# Patient Record
Sex: FEMALE | Race: WHITE | NOT HISPANIC OR LATINO | Employment: FULL TIME | URBAN - METROPOLITAN AREA
[De-identification: names, ages, dates, MRNs, and addresses within clinical notes are randomized per-mention and may not be internally consistent; named-entity substitution may affect disease eponyms.]

---

## 2023-07-28 LAB — URINE CULTURE: NORMAL

## 2023-08-23 ENCOUNTER — OFFICE VISIT (OUTPATIENT)
Dept: PRIMARY CARE | Facility: CLINIC | Age: 48
End: 2023-08-23
Payer: COMMERCIAL

## 2023-08-23 VITALS
WEIGHT: 292 LBS | HEART RATE: 75 BPM | OXYGEN SATURATION: 96 % | SYSTOLIC BLOOD PRESSURE: 133 MMHG | DIASTOLIC BLOOD PRESSURE: 83 MMHG | BODY MASS INDEX: 44.25 KG/M2 | HEIGHT: 68 IN

## 2023-08-23 DIAGNOSIS — R00.2 PALPITATIONS: Primary | ICD-10-CM

## 2023-08-23 PROCEDURE — 99204 OFFICE O/P NEW MOD 45 MIN: CPT | Performed by: NURSE PRACTITIONER

## 2023-08-23 RX ORDER — DILTIAZEM HYDROCHLORIDE 120 MG/1
120 CAPSULE, COATED, EXTENDED RELEASE ORAL DAILY
Qty: 30 CAPSULE | Refills: 5 | Status: SHIPPED | OUTPATIENT
Start: 2023-08-23 | End: 2023-11-22 | Stop reason: ALTCHOICE

## 2023-08-23 RX ORDER — MELOXICAM 15 MG/1
1 TABLET ORAL DAILY PRN
COMMUNITY
Start: 2018-05-20 | End: 2023-08-23 | Stop reason: ALTCHOICE

## 2023-08-23 RX ORDER — DIAZEPAM 2 MG/1
TABLET ORAL
COMMUNITY
Start: 2022-10-06 | End: 2023-08-23 | Stop reason: ALTCHOICE

## 2023-08-23 RX ORDER — OMEPRAZOLE 40 MG/1
CAPSULE, DELAYED RELEASE ORAL
COMMUNITY
Start: 2018-09-20 | End: 2023-08-23 | Stop reason: ALTCHOICE

## 2023-08-23 RX ORDER — METOPROLOL SUCCINATE 50 MG/1
50 TABLET, EXTENDED RELEASE ORAL EVERY 12 HOURS
COMMUNITY
End: 2023-08-23 | Stop reason: ALTCHOICE

## 2023-08-23 RX ORDER — ASPIRIN 325 MG
TABLET ORAL 2 TIMES DAILY
COMMUNITY
Start: 2018-09-19 | End: 2023-08-23 | Stop reason: ALTCHOICE

## 2023-08-23 RX ORDER — SODIUM FLUORIDE 6 MG/ML
PASTE, DENTIFRICE DENTAL
COMMUNITY
Start: 2023-01-01 | End: 2023-08-23 | Stop reason: ALTCHOICE

## 2023-08-23 ASSESSMENT — PATIENT HEALTH QUESTIONNAIRE - PHQ9
2. FEELING DOWN, DEPRESSED OR HOPELESS: NOT AT ALL
1. LITTLE INTEREST OR PLEASURE IN DOING THINGS: NOT AT ALL
SUM OF ALL RESPONSES TO PHQ9 QUESTIONS 1 AND 2: 0

## 2023-08-23 ASSESSMENT — ENCOUNTER SYMPTOMS
FEVER: 0
WEAKNESS: 0
SORE THROAT: 0
DIARRHEA: 0
ABDOMINAL PAIN: 0
PALPITATIONS: 1
MUSCULOSKELETAL NEGATIVE: 1
FATIGUE: 0
HEADACHES: 0
CONSTIPATION: 0
PSYCHIATRIC NEGATIVE: 1
LIGHT-HEADEDNESS: 1
VOMITING: 0
COUGH: 0
NAUSEA: 0
NUMBNESS: 0
SHORTNESS OF BREATH: 0
DIZZINESS: 0
CHILLS: 0

## 2023-08-23 NOTE — PROGRESS NOTES
"Subjective   Patient ID: Sarah Gay is a 47 y.o. female who presents  to establish relationship with primary care provider.        HPI   . 2 grown daughters. 1st grandson on the way.  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  Daily smoker. No alcohol use or illicit drug use.  Physically active at work no formal exercise routine.  Feels like she is fighting to stay conscious. Has been going for   At least 20 years. Happening about 3 times weekly.  Does not matter if she is exerting herself or resting.  Position does not seem to have an effect either.  Has known PFO.  Has longtime history of heart palpitations.  Has known PACs and PVCs.  Been on metoprolol for a long time.  Not able to tell if it is really working feels like the palpitations have become stronger not necessarily more frequent.  Admits to large amount of caffeine intake during the day.  Current smoker.  Had knee replacement at 43.  Suffers with arthritis pain in most of her joints.  Denies chest pain, SOB, dizziness,  or GI issues.    Review of Systems   Constitutional:  Negative for chills, fatigue and fever.   HENT:  Negative for congestion, ear pain and sore throat.    Eyes:  Negative for visual disturbance.   Respiratory:  Negative for cough and shortness of breath.         Feels like she can't can't her breath.   Cardiovascular:  Positive for palpitations. Negative for chest pain and leg swelling.   Gastrointestinal:  Negative for abdominal pain, constipation, diarrhea, nausea and vomiting.   Genitourinary: Negative.    Musculoskeletal: Negative.    Skin:  Negative for rash.   Neurological:  Positive for light-headedness. Negative for dizziness, weakness, numbness and headaches.   Psychiatric/Behavioral: Negative.         Objective   /83   Pulse 75   Ht 1.727 m (5' 8\")   Wt 132 kg (292 lb)   SpO2 96%   BMI 44.40 kg/m²     Physical Exam  Constitutional:       General: She is not in acute distress.     " Appearance: Normal appearance.   HENT:      Head: Normocephalic and atraumatic.      Right Ear: Tympanic membrane, ear canal and external ear normal.      Left Ear: Tympanic membrane, ear canal and external ear normal.      Nose: Nose normal.      Mouth/Throat:      Mouth: Mucous membranes are moist.      Pharynx: Oropharynx is clear.   Eyes:      Extraocular Movements: Extraocular movements intact.      Conjunctiva/sclera: Conjunctivae normal.      Pupils: Pupils are equal, round, and reactive to light.   Cardiovascular:      Rate and Rhythm: Normal rate and regular rhythm.      Pulses: Normal pulses.      Heart sounds: Normal heart sounds. No murmur heard.  Pulmonary:      Effort: Pulmonary effort is normal.      Breath sounds: Normal breath sounds. No wheezing, rhonchi or rales.   Abdominal:      General: Bowel sounds are normal.      Palpations: Abdomen is soft.      Tenderness: There is no abdominal tenderness.   Musculoskeletal:         General: Normal range of motion.      Cervical back: Normal range of motion and neck supple.   Lymphadenopathy:      Comments: No lymphadenopathy noted   Skin:     General: Skin is warm and dry.      Findings: No rash.   Neurological:      General: No focal deficit present.      Mental Status: She is alert and oriented to person, place, and time.      Cranial Nerves: No cranial nerve deficit.      Coordination: Coordination normal.      Gait: Gait normal.   Psychiatric:         Mood and Affect: Mood normal.         Behavior: Behavior normal.       Assessment/Plan   Problem List Items Addressed This Visit       Palpitations - Primary     Stop metoprolol  Start Cardizem 120 mg daily.  Have cardiac calcium scoring done.  Work on decreasing caffeine intake.  Monitor and record blood pressures at home bring log to next visit  We will consider echocardiogram for known PFO and possible correlation with symptoms

## 2023-08-23 NOTE — ASSESSMENT & PLAN NOTE
Stop metoprolol  Start Cardizem 120 mg daily.  Have cardiac calcium scoring done.  Work on decreasing caffeine intake.  Monitor and record blood pressures at home bring log to next visit  We will consider echocardiogram or R[facility]ETTA for known PFO and possible correlation with symptoms

## 2023-09-14 DIAGNOSIS — I10 HYPERTENSION, UNSPECIFIED TYPE: Primary | ICD-10-CM

## 2023-09-14 RX ORDER — LISINOPRIL 10 MG/1
10 TABLET ORAL DAILY
Qty: 30 TABLET | Refills: 5 | Status: SHIPPED | OUTPATIENT
Start: 2023-09-14 | End: 2023-11-22 | Stop reason: ALTCHOICE

## 2023-09-14 NOTE — PROGRESS NOTES
Called and left message for Cherry FREEMAN to call patient and let her know she can start lisinopril 10 mg daily.  Continue to monitor blood pressure until she sees me at follow-up appointment.  Reassurance given.  Will evaluate at upcoming visit.

## 2023-10-04 ENCOUNTER — OFFICE VISIT (OUTPATIENT)
Dept: PRIMARY CARE | Facility: CLINIC | Age: 48
End: 2023-10-04
Payer: COMMERCIAL

## 2023-10-04 VITALS
DIASTOLIC BLOOD PRESSURE: 85 MMHG | OXYGEN SATURATION: 96 % | WEIGHT: 287 LBS | SYSTOLIC BLOOD PRESSURE: 124 MMHG | HEART RATE: 78 BPM | HEIGHT: 68 IN | BODY MASS INDEX: 43.5 KG/M2

## 2023-10-04 DIAGNOSIS — Q21.12 PATENT FORAMEN OVALE (HHS-HCC): ICD-10-CM

## 2023-10-04 DIAGNOSIS — I25.10 CORONARY ARTERY DISEASE INVOLVING NATIVE CORONARY ARTERY OF NATIVE HEART WITHOUT ANGINA PECTORIS: Primary | ICD-10-CM

## 2023-10-04 DIAGNOSIS — G43.809 OTHER MIGRAINE WITHOUT STATUS MIGRAINOSUS, NOT INTRACTABLE: ICD-10-CM

## 2023-10-04 DIAGNOSIS — R55 NEAR SYNCOPE: ICD-10-CM

## 2023-10-04 DIAGNOSIS — E78.5 HYPERLIPIDEMIA, UNSPECIFIED HYPERLIPIDEMIA TYPE: ICD-10-CM

## 2023-10-04 DIAGNOSIS — I10 HYPERTENSION, UNSPECIFIED TYPE: ICD-10-CM

## 2023-10-04 DIAGNOSIS — F17.200 TOBACCO DEPENDENCE: ICD-10-CM

## 2023-10-04 DIAGNOSIS — R00.2 PALPITATIONS: ICD-10-CM

## 2023-10-04 PROBLEM — G43.909 MIGRAINE WITHOUT STATUS MIGRAINOSUS, NOT INTRACTABLE: Status: ACTIVE | Noted: 2023-10-04

## 2023-10-04 PROCEDURE — 99214 OFFICE O/P EST MOD 30 MIN: CPT | Performed by: NURSE PRACTITIONER

## 2023-10-04 PROCEDURE — 3074F SYST BP LT 130 MM HG: CPT | Performed by: NURSE PRACTITIONER

## 2023-10-04 PROCEDURE — 3079F DIAST BP 80-89 MM HG: CPT | Performed by: NURSE PRACTITIONER

## 2023-10-04 RX ORDER — ROSUVASTATIN CALCIUM 5 MG/1
5 TABLET, COATED ORAL DAILY
Qty: 90 TABLET | Refills: 3 | Status: SHIPPED | OUTPATIENT
Start: 2023-10-04 | End: 2023-11-22 | Stop reason: ALTCHOICE

## 2023-10-04 RX ORDER — PROPRANOLOL HYDROCHLORIDE 60 MG/1
60 CAPSULE, EXTENDED RELEASE ORAL DAILY
Qty: 30 CAPSULE | Refills: 11 | Status: SHIPPED | OUTPATIENT
Start: 2023-10-04 | End: 2023-11-22 | Stop reason: ALTCHOICE

## 2023-10-04 RX ORDER — ASPIRIN 81 MG/1
81 TABLET ORAL DAILY
COMMUNITY

## 2023-10-04 RX ORDER — ROSUVASTATIN CALCIUM 5 MG/1
5 TABLET, COATED ORAL DAILY
COMMUNITY
Start: 2023-09-28 | End: 2023-10-04 | Stop reason: SDUPTHER

## 2023-10-04 ASSESSMENT — ENCOUNTER SYMPTOMS
PSYCHIATRIC NEGATIVE: 1
WEAKNESS: 0
FATIGUE: 0
COUGH: 0
DIARRHEA: 0
HEADACHES: 0
CONSTIPATION: 0
FEVER: 0
PALPITATIONS: 1
MUSCULOSKELETAL NEGATIVE: 1
ABDOMINAL PAIN: 0
SHORTNESS OF BREATH: 0
NAUSEA: 0
SORE THROAT: 0
DIZZINESS: 1
CHILLS: 0
NUMBNESS: 0
VOMITING: 0

## 2023-10-04 NOTE — ASSESSMENT & PLAN NOTE
Scheduled for nuclear stress test.  Plan of care pending results.  Continue aspirin and statin as scheduled

## 2023-10-04 NOTE — ASSESSMENT & PLAN NOTE
Continue lisinopril as prescribed  Continue to monitor and record blood pressures at home goal BP still 130/80.

## 2023-10-04 NOTE — PROGRESS NOTES
"Subjective   Patient ID: Sarah Gay is a 47 y.o. female who presents for follow up.      HPI  Patient here today with her daughter.  Has seen cardiology, cardiac calcium score greater than 500.  Scheduled for stress test on Thursday  Patient has a lot of questions.  Continues to have near syncopal events.  Dizziness has not improved at all.  However palpitations seem to have lessened on the Cardizem.  Discussed ordering cardiac MRI and seeing electrophysiology for PFO and possible correlation to near syncope.  She continues to have a lot of joint pain and fatigue.  Denies chest pain, SOB,  or GI issues.  Taking medications as prescribed      Review of Systems   Constitutional:  Negative for chills, fatigue and fever.   HENT:  Negative for congestion, ear pain and sore throat.    Eyes:  Negative for visual disturbance.   Respiratory:  Negative for cough and shortness of breath.    Cardiovascular:  Positive for palpitations. Negative for chest pain and leg swelling.   Gastrointestinal:  Negative for abdominal pain, constipation, diarrhea, nausea and vomiting.   Genitourinary: Negative.    Musculoskeletal: Negative.    Skin:  Negative for rash.   Neurological:  Positive for dizziness and syncope (near syncope). Negative for weakness, numbness and headaches.   Psychiatric/Behavioral: Negative.         Objective   /85   Pulse 78   Ht 1.727 m (5' 8\")   Wt 130 kg (287 lb)   SpO2 96%   BMI 43.64 kg/m²     Physical Exam  Constitutional:       General: She is not in acute distress.     Appearance: Normal appearance.   HENT:      Head: Normocephalic and atraumatic.      Right Ear: Tympanic membrane, ear canal and external ear normal.      Left Ear: Tympanic membrane, ear canal and external ear normal.      Nose: Nose normal.      Mouth/Throat:      Mouth: Mucous membranes are moist.      Pharynx: Oropharynx is clear.   Eyes:      Extraocular Movements: Extraocular movements intact.      Conjunctiva/sclera: " Conjunctivae normal.      Pupils: Pupils are equal, round, and reactive to light.   Cardiovascular:      Rate and Rhythm: Normal rate and regular rhythm.      Pulses: Normal pulses.      Heart sounds: Normal heart sounds. No murmur heard.  Pulmonary:      Effort: Pulmonary effort is normal.      Breath sounds: Normal breath sounds. No wheezing, rhonchi or rales.   Abdominal:      General: Bowel sounds are normal.      Palpations: Abdomen is soft.      Tenderness: There is no abdominal tenderness.   Musculoskeletal:         General: Normal range of motion.      Cervical back: Normal range of motion and neck supple.   Lymphadenopathy:      Comments: No lymphadenopathy noted   Skin:     General: Skin is warm and dry.      Findings: No rash.   Neurological:      General: No focal deficit present.      Mental Status: She is alert and oriented to person, place, and time.      Cranial Nerves: No cranial nerve deficit.      Coordination: Coordination normal.      Gait: Gait normal.   Psychiatric:         Mood and Affect: Mood normal.         Behavior: Behavior normal.         Assessment/Plan   Problem List Items Addressed This Visit             ICD-10-CM    Palpitations R00.2     Continue Cardizem as prescribed  Follow-up with cardiology as scheduled         Migraine without status migrainosus, not intractable G43.909     Start propranolol 60 mg daily for migraine prophylaxis and palpitations         Relevant Medications    propranolol LA (Inderal LA) 60 mg 24 hr capsule    Other Relevant Orders    Referral to Cardiac Electrophysiology    Hyperlipidemia E78.5     Continue statin as prescribed  Have fasting lipids drawn with all other labs         Relevant Medications    propranolol LA (Inderal LA) 60 mg 24 hr capsule    rosuvastatin (Crestor) 5 mg tablet    Other Relevant Orders    Referral to Cardiac Electrophysiology    Comprehensive Metabolic Panel    TSH with reflex to Free T4 if abnormal    Lipid Panel    CBC and Auto  Differential    Hypertension I10     Continue lisinopril as prescribed  Continue to monitor and record blood pressures at home goal BP still 130/80.         Near syncope R55     Schedule cardiac MRI as ordered         Relevant Orders    MR cardiac morphology and function w and wo IV contrast    Coronary artery disease involving native coronary artery of native heart without angina pectoris - Primary I25.10     Scheduled for nuclear stress test.  Plan of care pending results.  Continue aspirin and statin as scheduled         Relevant Medications    propranolol LA (Inderal LA) 60 mg 24 hr capsule    Other Relevant Orders    MR cardiac morphology and function w and wo IV contrast    Patent foramen ovale Q21.12     Schedule cardiac MRI for PFO, migraines, persistent near syncopal episodes         Relevant Medications    propranolol LA (Inderal LA) 60 mg 24 hr capsule    Other Relevant Orders    MR cardiac morphology and function w and wo IV contrast    Tobacco dependence F17.200   Mammogram: up to date. Due 8/2024  Follow up after cardiac testing.

## 2023-10-04 NOTE — PATIENT INSTRUCTIONS
Continue Cardizem, rosuvastatin, aspirin.  Start propranolol 60 mg daily for palpitations and migraine preventative  Have a nuclear stress test as scheduled  Will order cardiac MRI for PFO and syncope  Will refer to cardiac electrophysiology for palpitations and syncope possible secondary to PFO  Discussed cardiac calcium score.  Follow-up with cardiology as scheduled after stress test follow-up with me after testing  Have fasting blood work drawn.  Fast for 12 hours can have black coffee water or tea

## 2023-10-06 ENCOUNTER — LAB (OUTPATIENT)
Dept: LAB | Facility: LAB | Age: 48
End: 2023-10-06
Payer: COMMERCIAL

## 2023-10-06 DIAGNOSIS — E78.5 HYPERLIPIDEMIA, UNSPECIFIED HYPERLIPIDEMIA TYPE: ICD-10-CM

## 2023-10-06 LAB
ALBUMIN SERPL BCP-MCNC: 4.4 G/DL (ref 3.4–5)
ALP SERPL-CCNC: 43 U/L (ref 33–110)
ALT SERPL W P-5'-P-CCNC: 9 U/L (ref 7–45)
ANION GAP SERPL CALC-SCNC: 14 MMOL/L (ref 10–20)
AST SERPL W P-5'-P-CCNC: 16 U/L (ref 9–39)
BASOPHILS # BLD AUTO: 0.04 X10*3/UL (ref 0–0.1)
BASOPHILS NFR BLD AUTO: 0.7 %
BILIRUB SERPL-MCNC: 0.8 MG/DL (ref 0–1.2)
BUN SERPL-MCNC: 9 MG/DL (ref 6–23)
CALCIUM SERPL-MCNC: 9.3 MG/DL (ref 8.6–10.3)
CHLORIDE SERPL-SCNC: 103 MMOL/L (ref 98–107)
CHOLEST SERPL-MCNC: 151 MG/DL (ref 0–199)
CHOLESTEROL/HDL RATIO: 3.8
CO2 SERPL-SCNC: 25 MMOL/L (ref 21–32)
CREAT SERPL-MCNC: 0.63 MG/DL (ref 0.5–1.05)
EOSINOPHIL # BLD AUTO: 0.04 X10*3/UL (ref 0–0.7)
EOSINOPHIL NFR BLD AUTO: 0.7 %
ERYTHROCYTE [DISTWIDTH] IN BLOOD BY AUTOMATED COUNT: 13.4 % (ref 11.5–14.5)
GFR SERPL CREATININE-BSD FRML MDRD: >90 ML/MIN/1.73M*2
GLUCOSE SERPL-MCNC: 84 MG/DL (ref 74–99)
HCT VFR BLD AUTO: 47.4 % (ref 36–46)
HDLC SERPL-MCNC: 40 MG/DL
HGB BLD-MCNC: 15.8 G/DL (ref 12–16)
IMM GRANULOCYTES # BLD AUTO: 0.01 X10*3/UL (ref 0–0.7)
IMM GRANULOCYTES NFR BLD AUTO: 0.2 % (ref 0–0.9)
LDLC SERPL CALC-MCNC: 100 MG/DL (ref 140–190)
LYMPHOCYTES # BLD AUTO: 2.04 X10*3/UL (ref 1.2–4.8)
LYMPHOCYTES NFR BLD AUTO: 35.4 %
MCH RBC QN AUTO: 30.6 PG (ref 26–34)
MCHC RBC AUTO-ENTMCNC: 33.3 G/DL (ref 32–36)
MCV RBC AUTO: 92 FL (ref 80–100)
MONOCYTES # BLD AUTO: 0.53 X10*3/UL (ref 0.1–1)
MONOCYTES NFR BLD AUTO: 9.2 %
NEUTROPHILS # BLD AUTO: 3.11 X10*3/UL (ref 1.2–7.7)
NEUTROPHILS NFR BLD AUTO: 53.8 %
NON HDL CHOLESTEROL: 111 MG/DL (ref 0–149)
NRBC BLD-RTO: 0 /100 WBCS (ref 0–0)
PLATELET # BLD AUTO: 276 X10*3/UL (ref 150–450)
PMV BLD AUTO: 10.4 FL (ref 7.5–11.5)
POTASSIUM SERPL-SCNC: 4.6 MMOL/L (ref 3.5–5.3)
PROT SERPL-MCNC: 6.9 G/DL (ref 6.4–8.2)
RBC # BLD AUTO: 5.17 X10*6/UL (ref 4–5.2)
SODIUM SERPL-SCNC: 137 MMOL/L (ref 136–145)
TRIGL SERPL-MCNC: 57 MG/DL (ref 0–149)
TSH SERPL-ACNC: 1.43 MIU/L (ref 0.44–3.98)
VLDL: 11 MG/DL (ref 0–40)
WBC # BLD AUTO: 5.8 X10*3/UL (ref 4.4–11.3)

## 2023-10-06 PROCEDURE — 36415 COLL VENOUS BLD VENIPUNCTURE: CPT

## 2023-10-06 PROCEDURE — 80050 GENERAL HEALTH PANEL: CPT

## 2023-10-06 PROCEDURE — 80061 LIPID PANEL: CPT

## 2023-10-10 ENCOUNTER — HOSPITAL ENCOUNTER (OUTPATIENT)
Dept: RADIOLOGY | Facility: HOSPITAL | Age: 48
Discharge: HOME | End: 2023-10-10
Payer: COMMERCIAL

## 2023-10-10 ENCOUNTER — HOSPITAL ENCOUNTER (OUTPATIENT)
Dept: CARDIOLOGY | Facility: HOSPITAL | Age: 48
Discharge: HOME | End: 2023-10-10
Payer: COMMERCIAL

## 2023-10-10 DIAGNOSIS — R93.1 ELEVATED CORONARY ARTERY CALCIUM SCORE: ICD-10-CM

## 2023-10-10 PROCEDURE — A9502 TC99M TETROFOSMIN: HCPCS | Performed by: STUDENT IN AN ORGANIZED HEALTH CARE EDUCATION/TRAINING PROGRAM

## 2023-10-10 PROCEDURE — 3430000001 HC RX 343 DIAGNOSTIC RADIOPHARMACEUTICALS: Performed by: STUDENT IN AN ORGANIZED HEALTH CARE EDUCATION/TRAINING PROGRAM

## 2023-10-10 PROCEDURE — 78452 HT MUSCLE IMAGE SPECT MULT: CPT

## 2023-10-10 PROCEDURE — 93016 CV STRESS TEST SUPVJ ONLY: CPT | Performed by: STUDENT IN AN ORGANIZED HEALTH CARE EDUCATION/TRAINING PROGRAM

## 2023-10-10 PROCEDURE — 93017 CV STRESS TEST TRACING ONLY: CPT

## 2023-10-10 PROCEDURE — 78452 HT MUSCLE IMAGE SPECT MULT: CPT | Performed by: STUDENT IN AN ORGANIZED HEALTH CARE EDUCATION/TRAINING PROGRAM

## 2023-10-10 PROCEDURE — 93018 CV STRESS TEST I&R ONLY: CPT | Performed by: STUDENT IN AN ORGANIZED HEALTH CARE EDUCATION/TRAINING PROGRAM

## 2023-10-10 RX ADMIN — TETROFOSMIN 33.6 MILLICURIE: 0.23 INJECTION, POWDER, LYOPHILIZED, FOR SOLUTION INTRAVENOUS at 11:00

## 2023-10-10 RX ADMIN — TETROFOSMIN 11.2 MILLICURIE: 0.23 INJECTION, POWDER, LYOPHILIZED, FOR SOLUTION INTRAVENOUS at 09:40

## 2023-10-11 NOTE — RESULT ENCOUNTER NOTE
Spoke with pt and reviewed stress test. She voiced some concern that Jana ordered a cardiac MRI on 10/4 when she saw her for concerns of her PFO worsening and thinks that might be why she is having palpiations.. She is wondering your opinion if you can look at Jana's note

## 2023-10-25 ENCOUNTER — OFFICE VISIT (OUTPATIENT)
Dept: PRIMARY CARE | Facility: CLINIC | Age: 48
End: 2023-10-25
Payer: COMMERCIAL

## 2023-10-25 VITALS
OXYGEN SATURATION: 96 % | HEART RATE: 83 BPM | SYSTOLIC BLOOD PRESSURE: 121 MMHG | BODY MASS INDEX: 42.25 KG/M2 | HEIGHT: 68 IN | WEIGHT: 278.8 LBS | DIASTOLIC BLOOD PRESSURE: 83 MMHG

## 2023-10-25 DIAGNOSIS — E78.5 HYPERLIPIDEMIA, UNSPECIFIED HYPERLIPIDEMIA TYPE: ICD-10-CM

## 2023-10-25 DIAGNOSIS — R00.2 PALPITATIONS: ICD-10-CM

## 2023-10-25 DIAGNOSIS — Q21.12 PATENT FORAMEN OVALE (HHS-HCC): ICD-10-CM

## 2023-10-25 DIAGNOSIS — R42 DIZZINESS: Primary | ICD-10-CM

## 2023-10-25 DIAGNOSIS — I10 HYPERTENSION, UNSPECIFIED TYPE: ICD-10-CM

## 2023-10-25 DIAGNOSIS — F17.200 TOBACCO DEPENDENCE: ICD-10-CM

## 2023-10-25 DIAGNOSIS — I25.10 CORONARY ARTERY DISEASE INVOLVING NATIVE CORONARY ARTERY OF NATIVE HEART WITHOUT ANGINA PECTORIS: ICD-10-CM

## 2023-10-25 PROCEDURE — 99214 OFFICE O/P EST MOD 30 MIN: CPT | Performed by: NURSE PRACTITIONER

## 2023-10-25 PROCEDURE — 3074F SYST BP LT 130 MM HG: CPT | Performed by: NURSE PRACTITIONER

## 2023-10-25 PROCEDURE — 3079F DIAST BP 80-89 MM HG: CPT | Performed by: NURSE PRACTITIONER

## 2023-10-25 ASSESSMENT — ENCOUNTER SYMPTOMS
ARTHRALGIAS: 1
DIZZINESS: 1
NAUSEA: 0
PSYCHIATRIC NEGATIVE: 1
COUGH: 0
CONSTIPATION: 0
ABDOMINAL PAIN: 0
SORE THROAT: 0
FEVER: 0
HEADACHES: 0
MYALGIAS: 1
DIARRHEA: 0
NUMBNESS: 0
SHORTNESS OF BREATH: 0
VOMITING: 0
FATIGUE: 0
PALPITATIONS: 0
CHILLS: 0
WEAKNESS: 0

## 2023-10-25 ASSESSMENT — PATIENT HEALTH QUESTIONNAIRE - PHQ9
SUM OF ALL RESPONSES TO PHQ9 QUESTIONS 1 AND 2: 0
2. FEELING DOWN, DEPRESSED OR HOPELESS: NOT AT ALL
1. LITTLE INTEREST OR PLEASURE IN DOING THINGS: NOT AT ALL

## 2023-10-25 NOTE — PROGRESS NOTES
"Subjective   Patient ID: Sarah Gay is a 47 y.o. female who presents for follow up.      HPI   Dizzy 10 hours daily.  Like she is on a slow, rocking boat. No nausea. No visual disturbance.  Having ankle and leg swelling visit.  Has lost 9 pounds with strict diet over the past 3 weeks  Feeling occasional but tolerable palpitations on the Cardizem.  Has decreased caffeine intake.  Has decreased smoking significantly down to 8 to 10 cigarettes daily.  Would like to get answers as to why her joints are in pain all the time.  Does not want medications but wants to know if there is something wrong  Reviewed all inflammatory markers from previous labs.  Godwin current medications and possible side effects.  States she has had a dry cough for about 3 weeks.  Likely lisinopril.  Has had a stressful past few weeks.  Surgery done on her daughter 32 weeks pregnant.  She has not started propranolol for anxiety.  Does not like taking medications  Cardiac MRI scheduled for next Friday.    Review of Systems   Constitutional:  Negative for chills, fatigue and fever.   HENT:  Negative for congestion, ear pain and sore throat.    Eyes:  Negative for visual disturbance.   Respiratory:  Negative for cough and shortness of breath.    Cardiovascular:  Positive for leg swelling. Negative for chest pain and palpitations.   Gastrointestinal:  Negative for abdominal pain, constipation, diarrhea, nausea and vomiting.   Genitourinary: Negative.    Musculoskeletal:  Positive for arthralgias and myalgias.   Skin:  Negative for rash.   Neurological:  Positive for dizziness. Negative for weakness, numbness and headaches.   Psychiatric/Behavioral: Negative.         Objective   /83   Pulse 83   Ht 1.727 m (5' 8\")   Wt 126 kg (278 lb 12.8 oz)   LMP 10/07/2023   SpO2 96%   BMI 42.39 kg/m²     Physical Exam  Constitutional:       General: She is not in acute distress.     Appearance: Normal appearance.   HENT:      Head: Normocephalic and " atraumatic.      Right Ear: Tympanic membrane, ear canal and external ear normal.      Left Ear: Tympanic membrane, ear canal and external ear normal.      Nose: Nose normal.      Mouth/Throat:      Mouth: Mucous membranes are moist.      Pharynx: Oropharynx is clear.   Eyes:      Extraocular Movements: Extraocular movements intact.      Conjunctiva/sclera: Conjunctivae normal.      Pupils: Pupils are equal, round, and reactive to light.   Cardiovascular:      Rate and Rhythm: Normal rate and regular rhythm.      Pulses: Normal pulses.      Heart sounds: Normal heart sounds. No murmur heard.  Pulmonary:      Effort: Pulmonary effort is normal.      Breath sounds: Normal breath sounds. No wheezing, rhonchi or rales.   Abdominal:      General: Bowel sounds are normal.      Palpations: Abdomen is soft.      Tenderness: There is no abdominal tenderness.   Musculoskeletal:         General: Normal range of motion.      Cervical back: Normal range of motion and neck supple.   Lymphadenopathy:      Comments: No lymphadenopathy noted   Skin:     General: Skin is warm and dry.      Findings: No rash.   Neurological:      General: No focal deficit present.      Mental Status: She is alert and oriented to person, place, and time.      Cranial Nerves: No cranial nerve deficit.      Coordination: Coordination normal.      Gait: Gait normal.   Psychiatric:         Mood and Affect: Mood normal.         Behavior: Behavior normal.       Assessment/Plan   Problem List Items Addressed This Visit             ICD-10-CM    Palpitations R00.2    Hyperlipidemia E78.5    Hypertension I10    Coronary artery disease involving native coronary artery of native heart without angina pectoris I25.10    Patent foramen ovale Q21.12    Tobacco dependence F17.200    Dizziness - Primary R42   Decided we will do a trial for 30 days off of all medications.  Stopping diltiazem, lisinopril, rosuvastatin, and propranolol.  Doing this to see how patient feels  off of all medications, may be able to sort out symptoms.  Past cardiac MRI.  Follow-up in 30 days to re-address symptoms and medications.  If palpitations become intolerable may start metoprolol succinate 50 mg daily you have at home prior to next visit.

## 2023-11-22 ENCOUNTER — OFFICE VISIT (OUTPATIENT)
Dept: PRIMARY CARE | Facility: CLINIC | Age: 48
End: 2023-11-22
Payer: COMMERCIAL

## 2023-11-22 VITALS
WEIGHT: 281 LBS | OXYGEN SATURATION: 95 % | HEIGHT: 68 IN | DIASTOLIC BLOOD PRESSURE: 76 MMHG | SYSTOLIC BLOOD PRESSURE: 122 MMHG | HEART RATE: 89 BPM | BODY MASS INDEX: 42.59 KG/M2

## 2023-11-22 DIAGNOSIS — H65.493 CHRONIC OTITIS MEDIA OF BOTH EARS WITH EFFUSION: ICD-10-CM

## 2023-11-22 DIAGNOSIS — R42 DIZZINESS: ICD-10-CM

## 2023-11-22 DIAGNOSIS — I10 HYPERTENSION, UNSPECIFIED TYPE: ICD-10-CM

## 2023-11-22 DIAGNOSIS — R00.2 PALPITATIONS: ICD-10-CM

## 2023-11-22 DIAGNOSIS — R55 NEAR SYNCOPE: ICD-10-CM

## 2023-11-22 DIAGNOSIS — F17.200 TOBACCO DEPENDENCE: ICD-10-CM

## 2023-11-22 DIAGNOSIS — J32.9 CHRONIC SINUSITIS, UNSPECIFIED LOCATION: Primary | ICD-10-CM

## 2023-11-22 PROCEDURE — 3074F SYST BP LT 130 MM HG: CPT | Performed by: NURSE PRACTITIONER

## 2023-11-22 PROCEDURE — 3078F DIAST BP <80 MM HG: CPT | Performed by: NURSE PRACTITIONER

## 2023-11-22 PROCEDURE — 99214 OFFICE O/P EST MOD 30 MIN: CPT | Performed by: NURSE PRACTITIONER

## 2023-11-22 RX ORDER — AMOXICILLIN AND CLAVULANATE POTASSIUM 875; 125 MG/1; MG/1
875 TABLET, FILM COATED ORAL 2 TIMES DAILY
Qty: 20 TABLET | Refills: 0 | Status: SHIPPED | OUTPATIENT
Start: 2023-11-22 | End: 2023-12-02

## 2023-11-22 RX ORDER — METOPROLOL SUCCINATE 100 MG/1
100 TABLET, EXTENDED RELEASE ORAL DAILY
COMMUNITY
End: 2024-01-10 | Stop reason: SDUPTHER

## 2023-11-22 RX ORDER — FLUCONAZOLE 150 MG/1
150 TABLET ORAL ONCE
Qty: 1 TABLET | Refills: 1 | Status: SHIPPED | OUTPATIENT
Start: 2023-11-22 | End: 2023-11-22

## 2023-11-22 ASSESSMENT — ENCOUNTER SYMPTOMS
PALPITATIONS: 0
NUMBNESS: 0
HEADACHES: 0
PSYCHIATRIC NEGATIVE: 1
VOMITING: 0
DIZZINESS: 1
NAUSEA: 0
FEVER: 0
COUGH: 0
MUSCULOSKELETAL NEGATIVE: 1
CHILLS: 0
SORE THROAT: 0
WEAKNESS: 0
FATIGUE: 0
ABDOMINAL PAIN: 0
SHORTNESS OF BREATH: 0
CONSTIPATION: 0
DIARRHEA: 0

## 2023-11-22 ASSESSMENT — PATIENT HEALTH QUESTIONNAIRE - PHQ9
2. FEELING DOWN, DEPRESSED OR HOPELESS: NOT AT ALL
SUM OF ALL RESPONSES TO PHQ9 QUESTIONS 1 AND 2: 0
1. LITTLE INTEREST OR PLEASURE IN DOING THINGS: NOT AT ALL

## 2023-11-22 NOTE — PROGRESS NOTES
"Subjective   Patient ID: Sarah Gay is a 48 y.o. female who presents for follow up.    HPI   Has stopped caffeine.  Palpitations have improved significantly.  Was unable to do the cardiac MRI do to anxiety.  Tired of feeling dizzy.  Has been taking blood pressures at home.  Feels like they are up-and-down and she wants to know why.  Denies chest pain, SOB, palpitations,  or GI issues.  Has stopped taking all medications to see if she feels better.  Feels different but not necessarily better    Review of Systems   Constitutional:  Negative for chills, fatigue and fever.   HENT:  Negative for congestion, ear pain and sore throat.    Eyes:  Negative for visual disturbance.   Respiratory:  Negative for cough and shortness of breath.    Cardiovascular:  Negative for chest pain, palpitations and leg swelling.   Gastrointestinal:  Negative for abdominal pain, constipation, diarrhea, nausea and vomiting.   Genitourinary: Negative.    Musculoskeletal: Negative.    Skin:  Negative for rash.   Neurological:  Positive for dizziness. Negative for weakness, numbness and headaches.   Psychiatric/Behavioral: Negative.     1    Objective   /76   Pulse 89   Ht 1.727 m (5' 8\")   Wt 127 kg (281 lb)   SpO2 95%   BMI 42.73 kg/m²     Physical Exam  Constitutional:       General: She is not in acute distress.     Appearance: Normal appearance.   HENT:      Head: Normocephalic and atraumatic.      Right Ear: Tympanic membrane, ear canal and external ear normal.      Left Ear: Tympanic membrane, ear canal and external ear normal.      Nose: Nose normal.      Mouth/Throat:      Mouth: Mucous membranes are moist.      Pharynx: Oropharynx is clear.   Eyes:      Extraocular Movements: Extraocular movements intact.      Conjunctiva/sclera: Conjunctivae normal.      Pupils: Pupils are equal, round, and reactive to light.   Cardiovascular:      Rate and Rhythm: Normal rate and regular rhythm.      Pulses: Normal pulses.      Heart " sounds: Normal heart sounds. No murmur heard.  Pulmonary:      Effort: Pulmonary effort is normal.      Breath sounds: Normal breath sounds. No wheezing, rhonchi or rales.   Abdominal:      General: Bowel sounds are normal.      Palpations: Abdomen is soft.      Tenderness: There is no abdominal tenderness.   Musculoskeletal:         General: Normal range of motion.      Cervical back: Normal range of motion and neck supple.   Lymphadenopathy:      Comments: No lymphadenopathy noted   Skin:     General: Skin is warm and dry.      Findings: No rash.   Neurological:      General: No focal deficit present.      Mental Status: She is alert and oriented to person, place, and time.      Cranial Nerves: No cranial nerve deficit.      Coordination: Coordination normal.      Gait: Gait normal.   Psychiatric:         Mood and Affect: Mood normal.         Behavior: Behavior normal.       Assessment/Plan   Problem List Items Addressed This Visit             ICD-10-CM    Palpitations R00.2     Continue to keep caffeine to a minimum for palpitations         Hypertension I10     Continue to monitor and record blood pressures at home until next follow-up visit  Restart metoprolol succinate 100 mg daily         Near syncope R55    Relevant Orders    Referral to Physical Therapy    Tobacco dependence F17.200    Dizziness R42     Referral to physical therapy for dizziness and balance clinic         Relevant Orders    Referral to Physical Therapy    Chronic otitis media of both ears with effusion H65.493     Start Augmentin 875/125 twice daily for 10 days.  Trial to see if this clears up sinuses and dizziness possibly related to sinuses.  Start over-the-counter allergy medication with decongestant daily  Start Flonase over-the-counter as directed daily         Chronic sinusitis - Primary J32.9    Relevant Medications    amoxicillin-pot clavulanate (Augmentin) 875-125 mg tablet    fluconazole (Diflucan) 150 mg tablet   Trial of  treatment for sinusitis as possible cause of dizziness.  Continue to monitor and record blood pressures as well while on metoprolol.  Follow-up in 2 weeks

## 2023-11-22 NOTE — ASSESSMENT & PLAN NOTE
Start Augmentin 875/125 twice daily for 10 days.  Trial to see if this clears up sinuses and dizziness possibly related to sinuses.  Start over-the-counter allergy medication with decongestant daily  Start Flonase over-the-counter as directed daily

## 2023-11-22 NOTE — ASSESSMENT & PLAN NOTE
Continue to monitor and record blood pressures at home until next follow-up visit  Restart metoprolol succinate 100 mg daily

## 2023-12-06 ENCOUNTER — APPOINTMENT (OUTPATIENT)
Dept: PRIMARY CARE | Facility: CLINIC | Age: 48
End: 2023-12-06
Payer: COMMERCIAL

## 2023-12-20 ENCOUNTER — APPOINTMENT (OUTPATIENT)
Dept: PRIMARY CARE | Facility: CLINIC | Age: 48
End: 2023-12-20
Payer: COMMERCIAL

## 2023-12-22 ENCOUNTER — EVALUATION (OUTPATIENT)
Dept: PHYSICAL THERAPY | Facility: CLINIC | Age: 48
End: 2023-12-22
Payer: COMMERCIAL

## 2023-12-22 DIAGNOSIS — R42 DIZZINESS: Primary | ICD-10-CM

## 2023-12-22 DIAGNOSIS — R55 NEAR SYNCOPE: ICD-10-CM

## 2023-12-22 PROCEDURE — 97110 THERAPEUTIC EXERCISES: CPT | Mod: GP | Performed by: PHYSICAL THERAPIST

## 2023-12-22 PROCEDURE — 97163 PT EVAL HIGH COMPLEX 45 MIN: CPT | Mod: GP | Performed by: PHYSICAL THERAPIST

## 2023-12-22 ASSESSMENT — PAIN SCALES - GENERAL: PAINLEVEL_OUTOF10: 0 - NO PAIN

## 2023-12-22 ASSESSMENT — ENCOUNTER SYMPTOMS
DEPRESSION: 0
OCCASIONAL FEELINGS OF UNSTEADINESS: 0
LOSS OF SENSATION IN FEET: 0

## 2023-12-22 ASSESSMENT — PAIN - FUNCTIONAL ASSESSMENT: PAIN_FUNCTIONAL_ASSESSMENT: 0-10

## 2023-12-22 NOTE — PROGRESS NOTES
"Physical Therapy     Neuro Evaluation    Patient Name: Sarah Gay  MRN: [id number]  Today's Date: 12/22/23     Time Calculation  Start Time: 1030  Stop Time: 1120  Time Calculation (min): 50 min      Assessment:    Positional testing is negative. Pt appears to have vestibular hypofunction and will benefit from vestibular therapy. Recommended patient schedule with ENT to further evaluate right ear.    Plan:  Treatment/Interventions: Education/ Instruction, Manual therapy, Neuromuscular re-education, Therapeutic exercises  PT Plan: Skilled PT  PT Frequency: 1 time per week  Duration: 4-8 weeks  Rehab Potential: Good  Plan of Care Agreement: Patient    Current Problem:   1. Dizziness  Referral to Physical Therapy    Follow Up In Physical Therapy      2. Near syncope  Referral to Physical Therapy          Subjective   Pt presents with complaint of dizziness for past 20 years. Pt reports she saw an ENT years ago as well as an audiologist.Dizziness is worse at times but patient is unsure what aggravates it. Pt does report that she almost always notices it when walking in grocery store. Pt reports that she had a full cardiac work up as well as medication changes to try and decrease dizziness, but didn't notice any improvement.  Symptoms seem worse when walking in the dark. Pt report that she notices pressure in her right ear    Pt's goal: \"Resolve dizziness\"       Precautions:  Precautions  Precautions Comment: none       Pain:  Pain Assessment  Pain Assessment: 0-10  Pain Score: 0 - No pain     Prior Level of Function:   Independent    Objective   Cognition:   WNL  Range of Motion:  Cervical ROM: WNL in all planes        Neuro Oculomotor:  Range of Motion: Normal  Smooth Pursuit: Normal  Horizontal Saccades: Normal  Vertical Saccades: Normal  Convergence: Normal     Positional Testing:  Jeni-Halpike Right: negative  Jeni-Halpike Left: negative  Horizontal Roll Test Right: negative  Horizontal Roll Test Left: negative     "      Outcome Measures:  Other Measures  Dizziness Handicap Inventory: 38/100     EXERCISES Date 12/22/23 Date Date Date    REPS REPS REPS REPS   Gaze stabilization head nods  30X      Gaze stabilization head rotation  30X                                                                                                                                                                                                          HEP         OP Education:    Access Code: [email or web address]  URL: [email or web address]  Date: 12/22/2023  Prepared by: Berta Jeter    Exercises  - Seated Gaze Stabilization with Head Rotation  - 3 x daily - 7 x weekly - 3 reps - 30 hold  - Seated Gaze Stabilization with Head Nod  - 3 x daily - 7 x weekly - 3 reps - 30 hold           Goals:  1. Decrease complaints of dizziness by 50%-4 weeks    2.Improve Dizziness Handicap Inventory score < 16/100 to facilitate return to prior level of function-4 weeks    3.Pt will be independent with HEP-4 weeks

## 2024-01-04 ENCOUNTER — APPOINTMENT (OUTPATIENT)
Dept: PHYSICAL THERAPY | Facility: CLINIC | Age: 49
End: 2024-01-04
Payer: COMMERCIAL

## 2024-01-04 ENCOUNTER — DOCUMENTATION (OUTPATIENT)
Dept: PHYSICAL THERAPY | Facility: CLINIC | Age: 49
End: 2024-01-04
Payer: COMMERCIAL

## 2024-01-04 NOTE — PROGRESS NOTES
Physical Therapy                 Therapy Communication Note    Patient Name: Sarah Gay  MRN: [id number]  :1975  Today's Date: 2024     Discipline: Physical Therapy    Missed Visit Reason:      Patient (SCHEDULE CONFLICT)       Missed Time: Cancel    Comment:

## 2024-01-10 ENCOUNTER — OFFICE VISIT (OUTPATIENT)
Dept: PRIMARY CARE | Facility: CLINIC | Age: 49
End: 2024-01-10
Payer: COMMERCIAL

## 2024-01-10 VITALS
DIASTOLIC BLOOD PRESSURE: 79 MMHG | OXYGEN SATURATION: 97 % | WEIGHT: 286 LBS | HEIGHT: 68 IN | BODY MASS INDEX: 43.35 KG/M2 | HEART RATE: 98 BPM | SYSTOLIC BLOOD PRESSURE: 117 MMHG

## 2024-01-10 DIAGNOSIS — R00.2 PALPITATIONS: Primary | ICD-10-CM

## 2024-01-10 DIAGNOSIS — Q21.12 PATENT FORAMEN OVALE (HHS-HCC): ICD-10-CM

## 2024-01-10 DIAGNOSIS — R42 DIZZINESS: ICD-10-CM

## 2024-01-10 PROCEDURE — 3074F SYST BP LT 130 MM HG: CPT | Performed by: NURSE PRACTITIONER

## 2024-01-10 PROCEDURE — 3078F DIAST BP <80 MM HG: CPT | Performed by: NURSE PRACTITIONER

## 2024-01-10 PROCEDURE — 99213 OFFICE O/P EST LOW 20 MIN: CPT | Performed by: NURSE PRACTITIONER

## 2024-01-10 RX ORDER — METOPROLOL SUCCINATE 100 MG/1
100 TABLET, EXTENDED RELEASE ORAL DAILY
Qty: 90 TABLET | Refills: 1 | Status: SHIPPED | OUTPATIENT
Start: 2024-01-10

## 2024-01-10 RX ORDER — PROPRANOLOL HYDROCHLORIDE 60 MG/1
CAPSULE, EXTENDED RELEASE ORAL
COMMUNITY
Start: 2023-10-04 | End: 2024-01-10 | Stop reason: ALTCHOICE

## 2024-01-10 RX ORDER — LISINOPRIL 10 MG/1
TABLET ORAL
COMMUNITY
Start: 2023-09-14 | End: 2024-01-10 | Stop reason: ALTCHOICE

## 2024-01-10 RX ORDER — ROSUVASTATIN CALCIUM 5 MG/1
TABLET, COATED ORAL
COMMUNITY
Start: 2023-09-28 | End: 2024-01-10 | Stop reason: ALTCHOICE

## 2024-01-10 RX ORDER — DILTIAZEM HYDROCHLORIDE 120 MG/1
CAPSULE, COATED, EXTENDED RELEASE ORAL
COMMUNITY
Start: 2023-08-23 | End: 2024-01-10 | Stop reason: ALTCHOICE

## 2024-01-10 ASSESSMENT — ENCOUNTER SYMPTOMS
WEAKNESS: 0
SORE THROAT: 0
PALPITATIONS: 0
NUMBNESS: 0
PSYCHIATRIC NEGATIVE: 1
SHORTNESS OF BREATH: 0
CHILLS: 0
VOMITING: 0
CONSTIPATION: 0
FATIGUE: 0
COUGH: 0
NAUSEA: 0
HEADACHES: 1
FEVER: 0
DIARRHEA: 0
ABDOMINAL PAIN: 0
DIZZINESS: 1
MUSCULOSKELETAL NEGATIVE: 1

## 2024-01-10 NOTE — PROGRESS NOTES
"Subjective   Patient ID: Sarah Gay is a 48 y.o. female who presents for follow up.    HPI   Tired of feeling dizzy. Wondering if she could be having vestibular migraines. Going to see ENT.  Has been taking blood pressures at home.  Well controlled on no medications. Denies chest pain, SOB, palpitations,  or GI issues.  Has stopped taking all medications to see if she feels better. Still taking metoprolol for palpitations. Well controlled. Feels better but not 100%.  Denies chest pain, SOB, palpitations, dizziness,  or GI issues.      Review of Systems   Constitutional:  Negative for chills, fatigue and fever.   HENT:  Negative for congestion, ear pain and sore throat.    Eyes:  Negative for visual disturbance.   Respiratory:  Negative for cough and shortness of breath.    Cardiovascular:  Negative for chest pain, palpitations and leg swelling.   Gastrointestinal:  Negative for abdominal pain, constipation, diarrhea, nausea and vomiting.   Genitourinary: Negative.    Musculoskeletal: Negative.    Skin:  Negative for rash.   Neurological:  Positive for dizziness and headaches. Negative for weakness and numbness.   Psychiatric/Behavioral: Negative.         Objective   /79   Pulse 98   Ht 1.727 m (5' 8\")   Wt 130 kg (286 lb)   SpO2 97%   BMI 43.49 kg/m²     Physical Exam  Constitutional:       General: She is not in acute distress.     Appearance: Normal appearance.   HENT:      Head: Normocephalic and atraumatic.      Right Ear: Tympanic membrane, ear canal and external ear normal.      Left Ear: Tympanic membrane, ear canal and external ear normal.      Nose: Nose normal.      Mouth/Throat:      Mouth: Mucous membranes are moist.      Pharynx: Oropharynx is clear.   Eyes:      Extraocular Movements: Extraocular movements intact.      Conjunctiva/sclera: Conjunctivae normal.      Pupils: Pupils are equal, round, and reactive to light.   Cardiovascular:      Rate and Rhythm: Normal rate and regular " rhythm.      Pulses: Normal pulses.      Heart sounds: Normal heart sounds. No murmur heard.  Pulmonary:      Effort: Pulmonary effort is normal.      Breath sounds: Normal breath sounds. No wheezing, rhonchi or rales.   Abdominal:      General: Bowel sounds are normal.      Palpations: Abdomen is soft.      Tenderness: There is no abdominal tenderness.   Musculoskeletal:         General: Normal range of motion.      Cervical back: Normal range of motion and neck supple.   Lymphadenopathy:      Comments: No lymphadenopathy noted   Skin:     General: Skin is warm and dry.      Findings: No rash.   Neurological:      General: No focal deficit present.      Mental Status: She is alert and oriented to person, place, and time.      Cranial Nerves: No cranial nerve deficit.      Coordination: Coordination normal.      Gait: Gait normal.   Psychiatric:         Mood and Affect: Mood normal.         Behavior: Behavior normal.       Assessment/Plan   Problem List Items Addressed This Visit             ICD-10-CM    Palpitations - Primary R00.2    Relevant Medications    metoprolol succinate XL (Toprol-XL) 100 mg 24 hr tablet   See ENT as scheduled.  Follow up in 6 months or sooner if needed

## 2024-01-12 ENCOUNTER — APPOINTMENT (OUTPATIENT)
Dept: PHYSICAL THERAPY | Facility: CLINIC | Age: 49
End: 2024-01-12
Payer: COMMERCIAL

## 2024-01-19 ENCOUNTER — APPOINTMENT (OUTPATIENT)
Dept: PHYSICAL THERAPY | Facility: CLINIC | Age: 49
End: 2024-01-19
Payer: COMMERCIAL

## 2024-01-22 ENCOUNTER — OFFICE VISIT (OUTPATIENT)
Dept: CARDIOLOGY | Facility: HOSPITAL | Age: 49
End: 2024-01-22
Payer: COMMERCIAL

## 2024-01-22 VITALS
BODY MASS INDEX: 43.41 KG/M2 | HEART RATE: 78 BPM | WEIGHT: 285.5 LBS | DIASTOLIC BLOOD PRESSURE: 81 MMHG | OXYGEN SATURATION: 96 % | SYSTOLIC BLOOD PRESSURE: 116 MMHG

## 2024-01-22 DIAGNOSIS — R00.2 PALPITATIONS: ICD-10-CM

## 2024-01-22 DIAGNOSIS — E78.49 OTHER HYPERLIPIDEMIA: Primary | ICD-10-CM

## 2024-01-22 PROCEDURE — 3079F DIAST BP 80-89 MM HG: CPT | Performed by: NURSE PRACTITIONER

## 2024-01-22 PROCEDURE — 99213 OFFICE O/P EST LOW 20 MIN: CPT | Performed by: NURSE PRACTITIONER

## 2024-01-22 PROCEDURE — 3074F SYST BP LT 130 MM HG: CPT | Performed by: NURSE PRACTITIONER

## 2024-01-22 RX ORDER — ROSUVASTATIN CALCIUM 5 MG/1
5 TABLET, COATED ORAL DAILY
Qty: 90 TABLET | Refills: 3 | Status: SHIPPED | OUTPATIENT
Start: 2024-01-22 | End: 2025-01-21

## 2024-01-22 ASSESSMENT — ENCOUNTER SYMPTOMS
PALPITATIONS: 1
MYALGIAS: 1
NEUROLOGICAL NEGATIVE: 1
ENDOCRINE NEGATIVE: 1
RESPIRATORY NEGATIVE: 1
HEMATOLOGIC/LYMPHATIC NEGATIVE: 1
GASTROINTESTINAL NEGATIVE: 1
PSYCHIATRIC NEGATIVE: 1
EYES NEGATIVE: 1
DEPRESSION: 0
CONSTITUTIONAL NEGATIVE: 1

## 2024-01-22 NOTE — PROGRESS NOTES
"Referred by Dr. Corona ref. provider found for Follow-up (6 mth.  States she was unable to complete the \"morphology\" d/t claustrophobia and should she be on a statin?  States she is going to consult with an ENT d/t fluid in the ears and dizziness.)     History Of Present Illness:    Sarah Gay is a very pleasant 48 year old female with a history of PVCs and migraines, she is here for a follow up visit. The patient is seen in collaboration with Dr. Cormier. Mrs. Gay has noticed the heart palpitations have decreased since she has decreased her caffeine intake. She was taken off all of her medications for awhile and then restarted, is feeling better. CT calcium score 522, nuclear stress test is negative for ischemia. Denies chest pain or shortness of breath. She has decreased smoking.       Review of Systems   Constitutional: Negative.   HENT: Negative.     Eyes: Negative.    Cardiovascular:  Positive for palpitations.   Respiratory: Negative.     Endocrine: Negative.    Hematologic/Lymphatic: Negative.    Skin: Negative.    Musculoskeletal:  Positive for myalgias.   Gastrointestinal: Negative.    Neurological: Negative.    Psychiatric/Behavioral: Negative.            Past Medical History:  She has a past medical history of Congenital heart disease, Coronary artery disease, Hypertension, Open bite of unspecified hand, initial encounter (05/04/2021), Other conditions influencing health status (08/09/2021), and Other specified health status.    Past Surgical History:  She has a past surgical history that includes Tonsillectomy (02/01/2017); Other surgical history (10/05/2022); MR angio head wo IV contrast (1/16/2017); MR angio neck wo IV contrast (1/16/2017); and MR angio head wo IV contrast (8/25/2012).      Social History:  She reports that she has been smoking cigarettes. She has a 15.00 pack-year smoking history. She has never used smokeless tobacco. She reports that she does not drink alcohol and does not use " drugs.    Family History:  Family History   Problem Relation Name Age of Onset    Abnormal EKG Mother Katharine     Abnormal EKG Sister Kenyatta     Cancer Sister Siri         Allergies:  Adhesive tape-silicones, Bee venom protein (honey bee), Shellfish derived, and Grass pollen    Outpatient Medications:  Current Outpatient Medications   Medication Instructions    aspirin 81 mg, oral, Daily    metoprolol succinate XL (TOPROL-XL) 100 mg, oral, Daily, Do not crush or chew.        Last Recorded Vitals:  Vitals:    01/22/24 0909   BP: 116/81   Pulse: 78   SpO2: 96%   Weight: 129 kg (285 lb 7.9 oz)       Physical Exam:  Physical Exam  Vitals reviewed.   HENT:      Head: Normocephalic.      Nose: Nose normal.   Eyes:      Pupils: Pupils are equal, round, and reactive to light.   Cardiovascular:      Rate and Rhythm: Normal rate and regular rhythm.   Pulmonary:      Effort: Pulmonary effort is normal.      Breath sounds: Normal breath sounds.   Abdominal:      General: Abdomen is flat.      Palpations: Abdomen is soft.   Musculoskeletal:         General: Normal range of motion.      Cervical back: Normal range of motion.   Skin:     General: Skin is warm and dry.   Neurological:      General: No focal deficit present.      Mental Status: He is alert and oriented to person, place, and time.   Psychiatric:         Mood and Affect: Mood normal.            Last Labs:  CBC -  Lab Results   Component Value Date    WBC 5.8 10/06/2023    HGB 15.8 10/06/2023    HCT 47.4 (H) 10/06/2023    MCV 92 10/06/2023     10/06/2023       CMP -  Lab Results   Component Value Date    CALCIUM 9.3 10/06/2023    PROT 6.9 10/06/2023    ALBUMIN 4.4 10/06/2023    AST 16 10/06/2023    ALT 9 10/06/2023    ALKPHOS 43 10/06/2023    BILITOT 0.8 10/06/2023       LIPID PANEL -   Lab Results   Component Value Date    CHOL 151 10/06/2023    TRIG 57 10/06/2023    HDL 40.0 10/06/2023    CHHDL 3.8 10/06/2023    LDLF 147 (H) 10/28/2022    VLDL 11 10/06/2023     "NHDL 111 10/06/2023       RENAL FUNCTION PANEL -   Lab Results   Component Value Date    GLUCOSE 84 10/06/2023     10/06/2023    K 4.6 10/06/2023     10/06/2023    CO2 25 10/06/2023    ANIONGAP 14 10/06/2023    BUN 9 10/06/2023    CREATININE 0.63 10/06/2023    CALCIUM 9.3 10/06/2023    ALBUMIN 4.4 10/06/2023        No results found for: \"BNP\", \"HGBA1C\"    Last Cardiology Tests:  ECG:    Echo:  Echo 1/17/17:   1. The left ventricular systolic function is normal with a 55% estimated  ejection fraction.   2. Spectral Doppler shows an impaired relaxation pattern of left ventricular  diastolic filling.   3. The left atrium is upper limits of normal in size.   4. There is mild tricuspid regurgitation.   5. A PFO is present (positive bubble study).    Ejection Fractions:  LVEF 55%  Cath:    Stress Test:  Nuclear Stress Test 10/10/2023  1. Negative myocardial perfusion study without evidence of inducible  myocardial ischemia or prior infarction.  2. The left ventricle is normal in size.  3. Normal LV wall motion with a post-stress LV EF estimated at 57%.    Cardiac Imaging:  CT cardiac scoring wo IV contrast 09/27/2023  LM 0,  .91,  LCx 0,  .3,     Total 522.21      Assessment/Plan   Very pleasant 48-year-old female with history of PVCs and migraine with aura, CT calcium score is elevated 522, nuclear stress test was negative for ischemia. Heart palpitations have improved.  Smoking cessation strongly encouraged. Heart rate and blood pressure is well controlled today.     Plan   -call with any questions   -continue Aspirin and Metoprolol   -smoking cessation strongly encouraged   -increase activity   -follow up in six months   -start Crestor 5 mg daily        Aparna Crump, APRN-CNP  "

## 2024-01-26 ENCOUNTER — APPOINTMENT (OUTPATIENT)
Dept: PHYSICAL THERAPY | Facility: CLINIC | Age: 49
End: 2024-01-26
Payer: COMMERCIAL

## 2024-02-13 PROBLEM — B34.9 PHARYNGITIS WITH VIRAL SYNDROME: Status: ACTIVE | Noted: 2024-02-13

## 2024-02-13 PROBLEM — J02.9 PHARYNGITIS WITH VIRAL SYNDROME: Status: ACTIVE | Noted: 2024-02-13

## 2024-02-13 PROBLEM — H91.90 HEARING LOSS: Status: ACTIVE | Noted: 2024-02-13

## 2024-02-13 PROBLEM — R05.9 COUGH: Status: ACTIVE | Noted: 2024-02-13

## 2024-02-13 PROBLEM — J02.9 ACUTE PHARYNGITIS: Status: ACTIVE | Noted: 2024-02-13

## 2024-02-15 ENCOUNTER — OFFICE VISIT (OUTPATIENT)
Dept: OTOLARYNGOLOGY | Facility: CLINIC | Age: 49
End: 2024-02-15
Payer: COMMERCIAL

## 2024-02-15 VITALS — WEIGHT: 280 LBS | BODY MASS INDEX: 42.57 KG/M2

## 2024-02-15 DIAGNOSIS — J34.2 DEVIATED SEPTUM: Primary | ICD-10-CM

## 2024-02-15 DIAGNOSIS — H69.90 DISORDER OF EUSTACHIAN TUBE, UNSPECIFIED LATERALITY: ICD-10-CM

## 2024-02-15 DIAGNOSIS — R42 DIZZINESS: ICD-10-CM

## 2024-02-15 DIAGNOSIS — Z86.69 HISTORY OF MIGRAINE: ICD-10-CM

## 2024-02-15 DIAGNOSIS — R42 VERTIGO: ICD-10-CM

## 2024-02-15 PROCEDURE — 4004F PT TOBACCO SCREEN RCVD TLK: CPT | Performed by: GENERAL PRACTICE

## 2024-02-15 PROCEDURE — 99204 OFFICE O/P NEW MOD 45 MIN: CPT | Performed by: GENERAL PRACTICE

## 2024-02-15 RX ORDER — MOMETASONE FUROATE 50 UG/1
2 SPRAY, METERED NASAL 2 TIMES DAILY
Qty: 17 G | Refills: 2 | Status: SHIPPED | OUTPATIENT
Start: 2024-02-15 | End: 2024-03-16

## 2024-02-15 ASSESSMENT — PATIENT HEALTH QUESTIONNAIRE - PHQ9
1. LITTLE INTEREST OR PLEASURE IN DOING THINGS: NOT AT ALL
SUM OF ALL RESPONSES TO PHQ9 QUESTIONS 1 AND 2: 0
2. FEELING DOWN, DEPRESSED OR HOPELESS: NOT AT ALL

## 2024-02-15 NOTE — PROGRESS NOTES
Otolaryngology - Head and Neck Surgery Outpatient New Patient Visit Note        Assessment/Plan   Problem List Items Addressed This Visit             ICD-10-CM       Symptoms and Signs    Dizziness R42     Other Visit Diagnoses         Codes    Deviated septum    -  Primary J34.2    Vertigo     R42    Relevant Orders    Referral to Neurology    Vestibular Testing    Disorder of Eustachian tube, unspecified laterality     H69.90    Relevant Medications    mometasone (Nasonex) 50 mcg/actuation nasal spray    Other Relevant Orders    Referral to Audiology    History of migraine     Z86.69            48yoF with recurrent vertigo/dizziness suspicious for atypical migraine, but also with suspicion for right vestibular hypofunction on recent vestibular PT eval.   Will acquire audio/VNG to aid in eval.     Also with some rhinitis contributing to ETD symptoms.  Will control for rhinitis with nasonex and saline gel.  Dry rhinitis and irritation from flonase.      Discussed smoking cessation.            Follow up:  -plan for follow up in clinic as needed and after completion of ordered studies    All of the above findings, impressions, treatment planning and follow up plans were discussed with the patient who indicated understanding.  the patient was instructed to contact or return to clinic sooner if symptoms/signs persist or worsen despite the above management.      Perez Storm MD  Otolaryngology - Head and Neck Surgery            History Of Present Illness  Sarah Gay is a 48 y.o. female presenting for a longstanding history of dizziness.    Reports >20y of symptoms, with prior VNG 20y ago which was unremarkable by report.   Reports some chronic dysequilibrium and poor balance, but also episodes of worsened dizziness/vertigo.     Episodes last hours to multiple days.   Notes some association with eating, and some association with walking down aisles in stores such as [employer], but no consistent pattern of triggers.   No  associated aural fullness, hearing loss or tinnitus.  Headache inconsistently associated.  No obvious light/sound sensitivity.    No improvement with meclizine.     Reports some baseline hearing loss and tonal tinnitus b/l.  No recent audiogram.     Reports a history of recurrent AOM as a child, but rare otitis as a child.   Has been noted to have fluid in ears at urgent care and PCM intermittently.  Treated with augmentin without notable effect.     Reports a history of severe migraine with aura.  No obvious vertigo assocaited with migraine headaches.     Reports extensive workup for possible cardiac etiology.  Has PVC and other arrythmias, but not thought to be causitive to dizziness.      Underwent vestibular PT wth some effect, and PT thought some possible R>L vestibular hypofunction.        Notes some congestion and nasal obstruction.  Notes some waxing/waning ear fullness/pressure not obviously associated with dizziness.    Popping/clicking in ears with swallow and jaw movement.   Able to clear ears without difficulty.          Past Medical History  She has a past medical history of Congenital heart disease, Coronary artery disease, Hypertension, Open bite of unspecified hand, initial encounter (05/04/2021), Other conditions influencing health status (08/09/2021), and Other specified health status.    Surgical History  She has a past surgical history that includes Tonsillectomy (02/01/2017); Other surgical history (10/05/2022); MR angio head wo IV contrast (1/16/2017); MR angio neck wo IV contrast (1/16/2017); and MR angio head wo IV contrast (8/25/2012).     Social History  She reports that she has been smoking cigarettes. She has a 15.00 pack-year smoking history. She has never used smokeless tobacco. She reports that she does not drink alcohol and does not use drugs.    Family History  Family History   Problem Relation Name Age of Onset    Abnormal EKG Mother Katharine     Abnormal EKG Sister Kenyatta     Cancer  Sister Siri         Allergies  Adhesive tape-silicones, Bee venom protein (honey bee), Shellfish derived, and Grass pollen    Review of Systems  ROS: Pertinent positives as noted in HPI.    - CONSTITUTIONAL: Does not report weight loss, fever or chills.    - HEENT:   Ear: Does not report  ,  ,    , otalgia, otorrhea  Nose: Does not report  , rhinorrhea, epistaxis, decreased smell  Throat: Does not report pain, dysphagia, odynophagia  Larynx: Does not report hoarseness,  difficulty breathing, pain with speaking (odynophonia)  Neck: Does not report new masses, pain, swelling  Face: Does not report sinus pain, pressure, swelling, numbness, weakness     - RESPIRATORY: Does not report SOB or cough.    - CV: Does not report palpitations or chest pain.     - GI: Does not report abdominal pain, nausea, vomiting or diarrhea.    - : Does not report dysuria or urinary frequency.    - MSK: Does not report myalgia or joint pain.    - SKIN: Does not report rash or pruritus.    - NEUROLOGICAL: Does not report headache or syncope.    - PSYCHIATRIC: Does not report recent changes in mood. Does not report anxiety or depression.         Physical Exam:     GENERAL:   Alert & Oriented to person, place and time; Normal affect and appearance. Well developed and well nourished. Conversant & cooperative with examination.     HEAD:   Normocephalic, atraumatic. No sinus tenderness to palpation. Normal parotid bilaterally. Normal facial strength.     NEUROLOGIC:   Cranial nerves II-XII grossly intact, gait WNL. Normal mood and affect.    EYES:   Extraocular movements intact. Pupils equal, round, reactive to light and accommodation. No nystagmus, no ptosis. no scleral injection.    EAR:   Normal auricle. No discomfort or TTP with manipulation.   Handheld otoscopic exam showed normal external auditory canals bilaterally. No purulence or EAC inflammation. Minimal cerumen.   Right tympanic membrane clear and mobile without evidence of  perforation, retraction or middle ear effusion.   Left tympanic membrane clear and mobile without evidence of perforation, retraction or middle ear effusion.     NOSE:   No external deformity. No external nasal lesions, lacerations, or scars. Nasal tip symmetrical with normal nasal valves.   Nasal cavity with leftward deviatd septum, dry/irritated mucosa and turbinates. No lesions, masses, purulence or polyps.     OC/OP:   Mucous membranes moist, no masses, lesions or exudates.   Normal tongue, floor of mouth, teeth, gums, lips. Normal posterior pharyngeal wall.    Normal tonsils without erythema, exudate or obvious calculi     NECK:   No neck masses or thyroid enlargement. Trachea midline. No tenderness to palpation    LYMPHATIC:   No cervical lymphadenopathy.     RESPIRATORY:   Symmetric chest elevation & no retractions. No significant hoarseness. No increased work of breathing.    CV:   No clubbing or cyanosis. No obvious edema    Skin:   No facial rashes, vesicles or lesions.     Extremities:   No gross abnormalities      Clinic Procedure        Information review:  External sources (notes, imaging, lab results) listed below personally reviewed to aid in medical decision making process.  - CT head 9/19/22  -vestib PT 12/22/23  - PCM note Bruening 1/10/24

## 2024-06-30 DIAGNOSIS — R00.2 PALPITATIONS: ICD-10-CM

## 2024-07-01 RX ORDER — METOPROLOL SUCCINATE 100 MG/1
100 TABLET, EXTENDED RELEASE ORAL DAILY
Qty: 30 TABLET | Refills: 0 | Status: SHIPPED | OUTPATIENT
Start: 2024-07-01

## 2024-07-17 ENCOUNTER — APPOINTMENT (OUTPATIENT)
Dept: PRIMARY CARE | Facility: CLINIC | Age: 49
End: 2024-07-17
Payer: COMMERCIAL

## 2024-07-18 ENCOUNTER — APPOINTMENT (OUTPATIENT)
Dept: UROLOGY | Facility: CLINIC | Age: 49
End: 2024-07-18
Payer: COMMERCIAL

## 2024-07-18 DIAGNOSIS — N81.9 FEMALE GENITAL PROLAPSE, UNSPECIFIED TYPE: ICD-10-CM

## 2024-07-18 DIAGNOSIS — N95.1 VASOMOTOR SYMPTOMS DUE TO MENOPAUSE: ICD-10-CM

## 2024-07-18 DIAGNOSIS — Z12.39 ENCOUNTER FOR OTHER SCREENING FOR MALIGNANT NEOPLASM OF BREAST: Primary | ICD-10-CM

## 2024-07-18 DIAGNOSIS — R30.0 DYSURIA: ICD-10-CM

## 2024-07-18 DIAGNOSIS — N32.81 OAB (OVERACTIVE BLADDER): ICD-10-CM

## 2024-07-18 DIAGNOSIS — N39.3 SUI (STRESS URINARY INCONTINENCE, FEMALE): ICD-10-CM

## 2024-07-18 PROCEDURE — 99214 OFFICE O/P EST MOD 30 MIN: CPT | Performed by: STUDENT IN AN ORGANIZED HEALTH CARE EDUCATION/TRAINING PROGRAM

## 2024-07-18 PROCEDURE — G2211 COMPLEX E/M VISIT ADD ON: HCPCS | Performed by: STUDENT IN AN ORGANIZED HEALTH CARE EDUCATION/TRAINING PROGRAM

## 2024-07-18 NOTE — PROGRESS NOTES
"HISTORY OF PRESENT ILLNESS:  Sarah Gay is a 48 y.o. female who presents today for a yearly follow up visit. She reports in the last year and a half she has had 2 periods. She has not had a mammogram yet this year.          Past Medical History  She has a past medical history of Congenital heart disease ([facility]), Coronary artery disease, Hypertension, Open bite of unspecified hand, initial encounter (05/04/2021), Other conditions influencing health status (08/09/2021), and Other specified health status.    Surgical History  She has a past surgical history that includes Tonsillectomy (02/01/2017); Other surgical history (10/05/2022); MR angio head wo IV contrast (1/16/2017); MR angio neck wo IV contrast (1/16/2017); and MR angio head wo IV contrast (8/25/2012).     Social History  She reports that she has been smoking cigarettes. She has a 15 pack-year smoking history. She has never used smokeless tobacco. She reports that she does not drink alcohol and does not use drugs.    Family History  Family History   Problem Relation Name Age of Onset    Abnormal EKG Mother Katharine     Abnormal EKG Sister Kenyatta     Cancer Sister Siri         Allergies  Adhesive tape-silicones, Bee venom protein (honey bee), Shellfish derived, and Grass pollen      A comprehensive 10+ review of systems was negative except for: see hpi                          PHYSICAL EXAMINATION:  BP Readings from Last 3 Encounters:   01/22/24 116/81   01/10/24 117/79   11/22/23 122/76      Wt Readings from Last 3 Encounters:   02/15/24 127 kg (280 lb)   01/22/24 129 kg (285 lb 7.9 oz)   01/10/24 130 kg (286 lb)      BMI: Estimated body mass index is 42.57 kg/m² as calculated from the following:    Height as of 1/10/24: 1.727 m (5' 8\").    Weight as of 2/15/24: 127 kg (280 lb).  BSA: Estimated body surface area is 2.47 meters squared as calculated from the following:    Height as of 1/10/24: 1.727 m (5' 8\").    Weight as of 2/15/24: 127 kg (280 " lb).  HEENT: Normocephalic, atraumatic, PER EOMI, nonicteric, trachea normal, thyroid normal, oropharynx normal.  CARDIAC: regular rate & rhythm, S1 & S2 normal.  No heaves, thrills, gallops or murmurs.  LUNGS: Clear to auscultation, no spinal or CV tenderness.  EXTREMITIES: No evidence of cyanosis, clubbing or edema.               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 However, there seems to be similar long-term success compared to sling with fewer side-effects. Main AE is urinary retention which resolves within 24 hours of using a 10-12 [place] catheter.  I discussed that if she still has some leakage after her procedure, she could perform another injection within 4 weeks and this procedure being performed in the office.        VM sx:   Not presently bothersome     UTI sx.   No current issues     Health Maintenance:   Last pap with HRHPV negative, 2022, negative, next 2027  Mammogram ordered     Follow up in one year or sooner, if necessary      All questions and concerns were answered and addressed.  The patient expressed understanding and agrees with the plan.     Gustabo Ma MD    Scribe Attestation  By signing my name below, I, Angela Bonds Scribrenata   attest that this documentation has been prepared under the direction and in the presence of Gustabo Ma MD.

## 2024-07-19 ENCOUNTER — OFFICE VISIT (OUTPATIENT)
Dept: CARDIOLOGY | Facility: HOSPITAL | Age: 49
End: 2024-07-19
Payer: COMMERCIAL

## 2024-07-19 VITALS
SYSTOLIC BLOOD PRESSURE: 128 MMHG | BODY MASS INDEX: 46.02 KG/M2 | DIASTOLIC BLOOD PRESSURE: 86 MMHG | OXYGEN SATURATION: 96 % | HEART RATE: 76 BPM | WEIGHT: 293 LBS

## 2024-07-19 DIAGNOSIS — E78.49 OTHER HYPERLIPIDEMIA: ICD-10-CM

## 2024-07-19 DIAGNOSIS — R00.2 PALPITATIONS: ICD-10-CM

## 2024-07-19 DIAGNOSIS — I10 HYPERTENSION, UNSPECIFIED TYPE: Primary | ICD-10-CM

## 2024-07-19 PROCEDURE — 3074F SYST BP LT 130 MM HG: CPT | Performed by: NURSE PRACTITIONER

## 2024-07-19 PROCEDURE — 3079F DIAST BP 80-89 MM HG: CPT | Performed by: NURSE PRACTITIONER

## 2024-07-19 PROCEDURE — 99213 OFFICE O/P EST LOW 20 MIN: CPT | Performed by: NURSE PRACTITIONER

## 2024-07-19 PROCEDURE — 4004F PT TOBACCO SCREEN RCVD TLK: CPT | Performed by: NURSE PRACTITIONER

## 2024-07-19 RX ORDER — METOPROLOL SUCCINATE 25 MG/1
25 TABLET, EXTENDED RELEASE ORAL DAILY
Qty: 90 TABLET | Refills: 3 | Status: SHIPPED | OUTPATIENT
Start: 2024-07-19 | End: 2025-07-19

## 2024-07-19 RX ORDER — ROSUVASTATIN CALCIUM 5 MG/1
5 TABLET, COATED ORAL DAILY
Qty: 90 TABLET | Refills: 3 | Status: SHIPPED | OUTPATIENT
Start: 2024-07-19 | End: 2025-07-19

## 2024-07-19 RX ORDER — METOPROLOL SUCCINATE 100 MG/1
100 TABLET, EXTENDED RELEASE ORAL DAILY
Qty: 90 TABLET | Refills: 3 | Status: SHIPPED | OUTPATIENT
Start: 2024-07-19 | End: 2025-07-19

## 2024-07-19 ASSESSMENT — ENCOUNTER SYMPTOMS
CONSTITUTIONAL NEGATIVE: 1
EYES NEGATIVE: 1
GASTROINTESTINAL NEGATIVE: 1
HEMATOLOGIC/LYMPHATIC NEGATIVE: 1
NEUROLOGICAL NEGATIVE: 1
PALPITATIONS: 1
RESPIRATORY NEGATIVE: 1
MYALGIAS: 1
PSYCHIATRIC NEGATIVE: 1
ENDOCRINE NEGATIVE: 1

## 2024-07-19 NOTE — PROGRESS NOTES
Referred by Dr. Chloe ordoñez. provider found for No chief complaint on file.     History Of Present Illness:    Sarah Gay is a very pleasant 48 year old female with a history of PVCs and migraines, she is here for a follow up visit. The patient is seen in collaboration with Dr. Cormier. Mrs. Gay has noticed the heart palpitations, states the palpitations are worse. Just does not feel well with the heart palpitations. She was on the Cardizem, blood pressure was elevated. The heart palpitations takes her breath away.  Denies chest pain or shortness of breath. She has decreased smoking. Feels the Metoprolol has been feeling better than when she is on the Cardizem.       Review of Systems   Constitutional: Negative.   HENT: Negative.     Eyes: Negative.    Cardiovascular:  Positive for palpitations.   Respiratory: Negative.     Endocrine: Negative.    Hematologic/Lymphatic: Negative.    Skin: Negative.    Musculoskeletal:  Positive for myalgias.   Gastrointestinal: Negative.    Neurological: Negative.    Psychiatric/Behavioral: Negative.            Past Medical History:  She has a past medical history of Congenital heart disease ([facility]), Coronary artery disease, Hypertension, Open bite of unspecified hand, initial encounter (05/04/2021), Other conditions influencing health status (08/09/2021), and Other specified health status.    Past Surgical History:  She has a past surgical history that includes Tonsillectomy (02/01/2017); Other surgical history (10/05/2022); MR angio head wo IV contrast (1/16/2017); MR angio neck wo IV contrast (1/16/2017); and MR angio head wo IV contrast (8/25/2012).      Social History:  She reports that she has been smoking cigarettes. She has a 15 pack-year smoking history. She has never used smokeless tobacco. She reports that she does not drink alcohol and does not use drugs.    Family History:  Family History   Problem Relation Name Age of Onset    Abnormal EKG Mother Katharine      Abnormal EKG Sister Kenyatta     Cancer Sister Siri         Allergies:  Adhesive tape-silicones, Bee venom protein (honey bee), Shellfish derived, and Grass pollen    Outpatient Medications:  Current Outpatient Medications   Medication Instructions    aspirin 81 mg, oral, Daily    metoprolol succinate XL (TOPROL-XL) 100 mg, oral, Daily, DO NOT CRUSH OR CHEW    mometasone (Nasonex) 50 mcg/actuation nasal spray 2 sprays, Each Nostril, 2 times daily    rosuvastatin (CRESTOR) 5 mg, oral, Daily        Last Recorded Vitals:  There were no vitals filed for this visit.      Physical Exam:  Physical Exam  Vitals reviewed.   HENT:      Head: Normocephalic.      Nose: Nose normal.   Eyes:      Pupils: Pupils are equal, round, and reactive to light.   Cardiovascular:      Rate and Rhythm: Normal rate and regular rhythm.   Pulmonary:      Effort: Pulmonary effort is normal.      Breath sounds: Normal breath sounds.   Abdominal:      General: Abdomen is flat.      Palpations: Abdomen is soft.   Musculoskeletal:         General: Normal range of motion.      Cervical back: Normal range of motion.   Skin:     General: Skin is warm and dry.   Neurological:      General: No focal deficit present.      Mental Status: He is alert and oriented to person, place, and time.   Psychiatric:         Mood and Affect: Mood normal.            Last Labs:  CBC -  Lab Results   Component Value Date    WBC 5.8 10/06/2023    HGB 15.8 10/06/2023    HCT 47.4 (H) 10/06/2023    MCV 92 10/06/2023     10/06/2023       CMP -  Lab Results   Component Value Date    CALCIUM 9.3 10/06/2023    PROT 6.9 10/06/2023    ALBUMIN 4.4 10/06/2023    AST 16 10/06/2023    ALT 9 10/06/2023    ALKPHOS 43 10/06/2023    BILITOT 0.8 10/06/2023       LIPID PANEL -   Lab Results   Component Value Date    CHOL 151 10/06/2023    TRIG 57 10/06/2023    HDL 40.0 10/06/2023    CHHDL 3.8 10/06/2023    LDLF 147 (H) 10/28/2022    VLDL 11 10/06/2023    NHDL 111 10/06/2023       RENAL  "FUNCTION PANEL -   Lab Results   Component Value Date    GLUCOSE 84 10/06/2023     10/06/2023    K 4.6 10/06/2023     10/06/2023    CO2 25 10/06/2023    ANIONGAP 14 10/06/2023    BUN 9 10/06/2023    CREATININE 0.63 10/06/2023    CALCIUM 9.3 10/06/2023    ALBUMIN 4.4 10/06/2023        No results found for: \"BNP\", \"HGBA1C\"    Last Cardiology Tests:  ECG:    Echo:  Echo 1/17/17:   1. The left ventricular systolic function is normal with a 55% estimated  ejection fraction.   2. Spectral Doppler shows an impaired relaxation pattern of left ventricular  diastolic filling.   3. The left atrium is upper limits of normal in size.   4. There is mild tricuspid regurgitation.   5. A PFO is present (positive bubble study).    Ejection Fractions:  LVEF 55%  Cath:    Stress Test:  Nuclear Stress Test 10/10/2023  1. Negative myocardial perfusion study without evidence of inducible  myocardial ischemia or prior infarction.  2. The left ventricle is normal in size.  3. Normal LV wall motion with a post-stress LV EF estimated at 57%.    Cardiac Imaging:  CT cardiac scoring wo IV contrast 09/27/2023  LM 0,  .91,  LCx 0,  .3,     Total 522.21      Assessment/Plan   Very pleasant 48-year-old female with history of PVCs and migraine with aura, CT calcium score is elevated 522, nuclear stress test was negative for ischemia. Heart palpitations are worse, the Metoprolol will be increased to 25 mg daily. Smoking cessation strongly encouraged. Heart rate and blood pressure is well controlled today.     Plan   -call with any questions   -continue Aspirin   -continue Metoprolol to 100 mg in the evening and 25 mg in the morning  -smoking cessation strongly encouraged   -increase activity   -follow up in six months   -continue  Crestor 5 mg daily        Aparna Crump, APRN-CNP  "

## 2024-07-19 NOTE — PATIENT INSTRUCTIONS
CALL WITH ANY QUESTIONS   INCREASE THE METOPROLOL TO 25 MG IN THE MORNING  MG DAILY   FOLLOW UP IN SIX MONTHS

## 2024-08-16 ENCOUNTER — HOSPITAL ENCOUNTER (OUTPATIENT)
Dept: RADIOLOGY | Facility: HOSPITAL | Age: 49
Discharge: HOME | End: 2024-08-16
Payer: COMMERCIAL

## 2024-08-16 VITALS — WEIGHT: 293 LBS | HEIGHT: 68 IN | BODY MASS INDEX: 44.41 KG/M2

## 2024-08-16 DIAGNOSIS — Z12.39 ENCOUNTER FOR OTHER SCREENING FOR MALIGNANT NEOPLASM OF BREAST: ICD-10-CM

## 2024-08-16 PROCEDURE — 77067 SCR MAMMO BI INCL CAD: CPT

## 2024-08-16 PROCEDURE — 77063 BREAST TOMOSYNTHESIS BI: CPT | Performed by: STUDENT IN AN ORGANIZED HEALTH CARE EDUCATION/TRAINING PROGRAM

## 2024-08-16 PROCEDURE — 77067 SCR MAMMO BI INCL CAD: CPT | Performed by: STUDENT IN AN ORGANIZED HEALTH CARE EDUCATION/TRAINING PROGRAM

## 2024-08-26 ENCOUNTER — APPOINTMENT (OUTPATIENT)
Dept: PRIMARY CARE | Facility: CLINIC | Age: 49
End: 2024-08-26
Payer: COMMERCIAL

## 2024-08-26 VITALS
SYSTOLIC BLOOD PRESSURE: 123 MMHG | DIASTOLIC BLOOD PRESSURE: 83 MMHG | OXYGEN SATURATION: 95 % | WEIGHT: 293 LBS | BODY MASS INDEX: 46.38 KG/M2 | HEART RATE: 84 BPM

## 2024-08-26 DIAGNOSIS — Z00.00 ENCOUNTER FOR PREVENTIVE HEALTH EXAMINATION: Primary | ICD-10-CM

## 2024-08-26 DIAGNOSIS — Z12.11 SCREENING FOR COLON CANCER: ICD-10-CM

## 2024-08-26 DIAGNOSIS — Z23 ENCOUNTER FOR PREVNAR PNEUMOCOCCAL VACCINATION: ICD-10-CM

## 2024-08-26 DIAGNOSIS — E78.5 HYPERLIPIDEMIA, UNSPECIFIED HYPERLIPIDEMIA TYPE: ICD-10-CM

## 2024-08-26 DIAGNOSIS — I10 HYPERTENSION, UNSPECIFIED TYPE: ICD-10-CM

## 2024-08-26 PROCEDURE — 3079F DIAST BP 80-89 MM HG: CPT | Performed by: INTERNAL MEDICINE

## 2024-08-26 PROCEDURE — 99396 PREV VISIT EST AGE 40-64: CPT | Performed by: INTERNAL MEDICINE

## 2024-08-26 PROCEDURE — 3074F SYST BP LT 130 MM HG: CPT | Performed by: INTERNAL MEDICINE

## 2024-08-26 PROCEDURE — 99214 OFFICE O/P EST MOD 30 MIN: CPT | Performed by: INTERNAL MEDICINE

## 2024-08-26 PROCEDURE — 4004F PT TOBACCO SCREEN RCVD TLK: CPT | Performed by: INTERNAL MEDICINE

## 2024-08-26 ASSESSMENT — PATIENT HEALTH QUESTIONNAIRE - PHQ9
1. LITTLE INTEREST OR PLEASURE IN DOING THINGS: NOT AT ALL
2. FEELING DOWN, DEPRESSED OR HOPELESS: NOT AT ALL
SUM OF ALL RESPONSES TO PHQ9 QUESTIONS 1 AND 2: 0

## 2024-08-26 NOTE — PROGRESS NOTES
"Subjective   Patient ID: Sarah Gay is a 48 y.o. female who presents for Annual Exam (Refill on medications, and physical /Would like to discuss possible side affects from rosuvastatin ).    HPI     New patient here to establish care     Reports lightheadedness and vertigo sporadically. Has seen ENT. Has feeling of being \"unwell.\" Has excessive daytime sleepiness, feels like she needs to take nap. Had sleep study about 20 years ago--never wore CPAP. Has gained 30 lbs in the past year or so.    Cardiac score 522, stress test neg --follows with cardiology for PVCs, takes metoprolol XL 125mg daily     Smoking 1/2-3/4 pack/day Has been smoking 25 years ago.     Denies CP, SOB, HINOJOSA, orthopnea, LE edema   No dysphagia or reflux   Bowels are regular, no melena or hematochezia   No LUTS       12 point review of systems negative unless otherwise noted in HPI       Review of Systems   All other systems reviewed and are negative.      Objective   /83   Pulse 84   Wt 138 kg (305 lb)   SpO2 95%   BMI 46.38 kg/m²     Physical Exam  Constitutional:       Appearance: Normal appearance. She is obese.   HENT:      Head: Normocephalic and atraumatic.   Cardiovascular:      Rate and Rhythm: Normal rate and regular rhythm.      Heart sounds: Normal heart sounds. No murmur heard.     No gallop.   Pulmonary:      Effort: Pulmonary effort is normal. No respiratory distress.      Breath sounds: No wheezing or rales.   Skin:     General: Skin is warm and dry.      Findings: No rash.   Neurological:      Mental Status: She is alert and oriented to person, place, and time. Mental status is at baseline.   Psychiatric:         Mood and Affect: Mood normal.         Behavior: Behavior normal.         Assessment/Plan       #PVCs, CAD (high cardiac score)  -Follow with cardiology. Cont metoprolol XL 125mg daily     #Fatigue, feeling \"unwell\"  -Likely multifactorial BB vs obesity vs LUCIANA vs other. Will work on weight loss for now. "     #HLD  -Cont rosuvastatin 5mg daily. Check FBW    -Class III obesity   -Lifestyle changes discussed. She will think about GLP1     #Nicotine dependence   -Encouraged complete cessation. She will consider Chantix. Needs CT chest low dose at age 49    Influenza:  COVID:  Prevnar 13:  Pneumovax 23:  Shingrix: Age 49  Mamm: 8/16/24  DEXA: Never   Pap: per gyn   Colorectal ca: Never   Lung ca screening: Age 49    RTC 8 weeks

## 2024-08-26 NOTE — PATIENT INSTRUCTIONS
Please complete fasting blood work. Fast for 10 hours, black coffee and water the morning of labs are OK.      [facility]: [phone]   [facility]: [phone]    8 weeks ago

## 2024-10-28 ENCOUNTER — APPOINTMENT (OUTPATIENT)
Dept: PRIMARY CARE | Facility: CLINIC | Age: 49
End: 2024-10-28
Payer: COMMERCIAL

## 2024-11-18 ENCOUNTER — APPOINTMENT (OUTPATIENT)
Dept: PRIMARY CARE | Facility: CLINIC | Age: 49
End: 2024-11-18
Payer: COMMERCIAL

## 2024-12-09 ENCOUNTER — APPOINTMENT (OUTPATIENT)
Dept: PRIMARY CARE | Facility: CLINIC | Age: 49
End: 2024-12-09
Payer: COMMERCIAL

## 2024-12-26 ENCOUNTER — APPOINTMENT (OUTPATIENT)
Dept: PRIMARY CARE | Facility: CLINIC | Age: 49
End: 2024-12-26
Payer: COMMERCIAL

## 2025-01-09 ENCOUNTER — APPOINTMENT (OUTPATIENT)
Dept: PRIMARY CARE | Facility: CLINIC | Age: 50
End: 2025-01-09
Payer: COMMERCIAL

## 2025-01-17 ENCOUNTER — OFFICE VISIT (OUTPATIENT)
Dept: CARDIOLOGY | Facility: HOSPITAL | Age: 50
End: 2025-01-17
Payer: COMMERCIAL

## 2025-01-17 ENCOUNTER — LAB (OUTPATIENT)
Dept: LAB | Facility: LAB | Age: 50
End: 2025-01-17
Payer: COMMERCIAL

## 2025-01-17 VITALS
SYSTOLIC BLOOD PRESSURE: 126 MMHG | OXYGEN SATURATION: 97 % | WEIGHT: 293 LBS | BODY MASS INDEX: 45.29 KG/M2 | DIASTOLIC BLOOD PRESSURE: 83 MMHG | HEART RATE: 67 BPM

## 2025-01-17 DIAGNOSIS — E78.49 OTHER HYPERLIPIDEMIA: ICD-10-CM

## 2025-01-17 DIAGNOSIS — R93.1 ELEVATED CORONARY ARTERY CALCIUM SCORE: ICD-10-CM

## 2025-01-17 DIAGNOSIS — I10 HYPERTENSION, UNSPECIFIED TYPE: Primary | ICD-10-CM

## 2025-01-17 DIAGNOSIS — E78.5 HYPERLIPIDEMIA, UNSPECIFIED HYPERLIPIDEMIA TYPE: ICD-10-CM

## 2025-01-17 DIAGNOSIS — R00.2 PALPITATIONS: ICD-10-CM

## 2025-01-17 LAB
ALBUMIN SERPL BCP-MCNC: 4.3 G/DL (ref 3.4–5)
ALP SERPL-CCNC: 46 U/L (ref 33–110)
ALT SERPL W P-5'-P-CCNC: 9 U/L (ref 7–45)
ANION GAP SERPL CALC-SCNC: 11 MMOL/L (ref 10–20)
AST SERPL W P-5'-P-CCNC: 14 U/L (ref 9–39)
BILIRUB SERPL-MCNC: 0.6 MG/DL (ref 0–1.2)
BUN SERPL-MCNC: 11 MG/DL (ref 6–23)
CALCIUM SERPL-MCNC: 9.1 MG/DL (ref 8.6–10.3)
CHLORIDE SERPL-SCNC: 106 MMOL/L (ref 98–107)
CHOLEST SERPL-MCNC: 143 MG/DL (ref 0–199)
CHOLESTEROL/HDL RATIO: 3.5
CO2 SERPL-SCNC: 28 MMOL/L (ref 21–32)
CREAT SERPL-MCNC: 0.76 MG/DL (ref 0.5–1.05)
EGFRCR SERPLBLD CKD-EPI 2021: >90 ML/MIN/1.73M*2
ERYTHROCYTE [DISTWIDTH] IN BLOOD BY AUTOMATED COUNT: 13.2 % (ref 11.5–14.5)
GLUCOSE SERPL-MCNC: 88 MG/DL (ref 74–99)
HCT VFR BLD AUTO: 46.3 % (ref 36–46)
HDLC SERPL-MCNC: 40.4 MG/DL
HGB BLD-MCNC: 15.2 G/DL (ref 12–16)
LDLC SERPL CALC-MCNC: 91 MG/DL
MCH RBC QN AUTO: 29.9 PG (ref 26–34)
MCHC RBC AUTO-ENTMCNC: 32.8 G/DL (ref 32–36)
MCV RBC AUTO: 91 FL (ref 80–100)
NON HDL CHOLESTEROL: 103 MG/DL (ref 0–149)
NRBC BLD-RTO: 0 /100 WBCS (ref 0–0)
PLATELET # BLD AUTO: 241 X10*3/UL (ref 150–450)
POTASSIUM SERPL-SCNC: 4.5 MMOL/L (ref 3.5–5.3)
PROT SERPL-MCNC: 6.5 G/DL (ref 6.4–8.2)
RBC # BLD AUTO: 5.08 X10*6/UL (ref 4–5.2)
SODIUM SERPL-SCNC: 140 MMOL/L (ref 136–145)
TRIGL SERPL-MCNC: 57 MG/DL (ref 0–149)
VLDL: 11 MG/DL (ref 0–40)
WBC # BLD AUTO: 6.1 X10*3/UL (ref 4.4–11.3)

## 2025-01-17 PROCEDURE — 85027 COMPLETE CBC AUTOMATED: CPT

## 2025-01-17 PROCEDURE — 99214 OFFICE O/P EST MOD 30 MIN: CPT | Performed by: NURSE PRACTITIONER

## 2025-01-17 PROCEDURE — 80061 LIPID PANEL: CPT

## 2025-01-17 PROCEDURE — 3079F DIAST BP 80-89 MM HG: CPT | Performed by: NURSE PRACTITIONER

## 2025-01-17 PROCEDURE — 3074F SYST BP LT 130 MM HG: CPT | Performed by: NURSE PRACTITIONER

## 2025-01-17 PROCEDURE — 93005 ELECTROCARDIOGRAM TRACING: CPT | Performed by: NURSE PRACTITIONER

## 2025-01-17 PROCEDURE — 80053 COMPREHEN METABOLIC PANEL: CPT

## 2025-01-17 RX ORDER — ROSUVASTATIN CALCIUM 5 MG/1
5 TABLET, COATED ORAL DAILY
Qty: 90 TABLET | Refills: 3 | Status: SHIPPED | OUTPATIENT
Start: 2025-01-17 | End: 2026-01-17

## 2025-01-17 ASSESSMENT — ENCOUNTER SYMPTOMS
GASTROINTESTINAL NEGATIVE: 1
EYES NEGATIVE: 1
HEMATOLOGIC/LYMPHATIC NEGATIVE: 1
CONSTITUTIONAL NEGATIVE: 1
MYALGIAS: 1
PALPITATIONS: 1
RESPIRATORY NEGATIVE: 1
PSYCHIATRIC NEGATIVE: 1
ENDOCRINE NEGATIVE: 1
NEUROLOGICAL NEGATIVE: 1

## 2025-01-17 NOTE — PROGRESS NOTES
Referred by Dr. Varner for Follow-up (6 mth.  Patient inquiring about a cardiovascular study that is voluntary.)     History Of Present Illness:    Sarah Gay is a very pleasant 49 year old female with a history of PVCs and migraines, she is here for a follow up visit. The patient is seen in collaboration with Dr. Cormier. Mrs. Gay is under increased stress at home. Only taking the Metoprolol 100 mg daily, takes 25 mg as needed. Palpitations have improved.       Review of Systems   Constitutional: Negative.   HENT: Negative.     Eyes: Negative.    Cardiovascular:  Positive for palpitations.   Respiratory: Negative.     Endocrine: Negative.    Hematologic/Lymphatic: Negative.    Skin: Negative.    Musculoskeletal:  Positive for myalgias.   Gastrointestinal: Negative.    Neurological: Negative.    Psychiatric/Behavioral: Negative.            Past Medical History:  She has a past medical history of Congenital heart disease, Coronary artery disease, Hypertension, Open bite of unspecified hand, initial encounter (05/04/2021), Other conditions influencing health status (08/09/2021), and Other specified health status.    Past Surgical History:  She has a past surgical history that includes Tonsillectomy (02/01/2017); Other surgical history (10/05/2022); MR angio head wo IV contrast (1/16/2017); MR angio neck wo IV contrast (1/16/2017); and MR angio head wo IV contrast (8/25/2012).      Social History:  She reports that she has been smoking cigarettes. She has a 15 pack-year smoking history. She has never used smokeless tobacco. She reports that she does not drink alcohol and does not use drugs.    Family History:  Family History   Problem Relation Name Age of Onset    Abnormal EKG Mother Katharine     Abnormal EKG Sister Kenyatta     Cancer Sister Siri     Ovarian cancer Sister Siri 30 - 39    Breast cancer Paternal Grandmother  60 - 69        Allergies:  Adhesive tape-silicones, Bee venom protein (honey bee),  Shellfish derived, and Grass pollen    Outpatient Medications:  Current Outpatient Medications   Medication Instructions    aspirin 81 mg, Daily    metoprolol succinate XL (TOPROL-XL) 25 mg, oral, Daily, Do not crush or chew.    metoprolol succinate XL (TOPROL-XL) 100 mg, oral, Daily, DO NOT CRUSH OR CHEW    rosuvastatin (CRESTOR) 5 mg, oral, Daily        Last Recorded Vitals:  Vitals:    01/17/25 0953   BP: 126/83   Pulse: 67   SpO2: 97%   Weight: 135 kg (297 lb 13.5 oz)         Physical Exam:  Physical Exam  Vitals reviewed.   HENT:      Head: Normocephalic.      Nose: Nose normal.   Eyes:      Pupils: Pupils are equal, round, and reactive to light.   Cardiovascular:      Rate and Rhythm: Normal rate and regular rhythm.   Pulmonary:      Effort: Pulmonary effort is normal.      Breath sounds: Normal breath sounds.   Abdominal:      General: Abdomen is flat.      Palpations: Abdomen is soft.   Musculoskeletal:         General: Normal range of motion.      Cervical back: Normal range of motion.   Skin:     General: Skin is warm and dry.   Neurological:      General: No focal deficit present.      Mental Status: He is alert and oriented to person, place, and time.   Psychiatric:         Mood and Affect: Mood normal.            Last Labs:  CBC -  Lab Results   Component Value Date    WBC 5.8 10/06/2023    HGB 15.8 10/06/2023    HCT 47.4 (H) 10/06/2023    MCV 92 10/06/2023     10/06/2023       CMP -  Lab Results   Component Value Date    CALCIUM 9.3 10/06/2023    PROT 6.9 10/06/2023    ALBUMIN 4.4 10/06/2023    AST 16 10/06/2023    ALT 9 10/06/2023    ALKPHOS 43 10/06/2023    BILITOT 0.8 10/06/2023       LIPID PANEL -   Lab Results   Component Value Date    CHOL 151 10/06/2023    TRIG 57 10/06/2023    HDL 40.0 10/06/2023    CHHDL 3.8 10/06/2023    LDLF 147 (H) 10/28/2022    VLDL 11 10/06/2023    NHDL 111 10/06/2023       RENAL FUNCTION PANEL -   Lab Results   Component Value Date    GLUCOSE 84 10/06/2023    NA  "137 10/06/2023    K 4.6 10/06/2023     10/06/2023    CO2 25 10/06/2023    ANIONGAP 14 10/06/2023    BUN 9 10/06/2023    CREATININE 0.63 10/06/2023    CALCIUM 9.3 10/06/2023    ALBUMIN 4.4 10/06/2023        No results found for: \"BNP\", \"HGBA1C\"    Last Cardiology Tests:  ECG:  EKG independently reviewed from today showed sinus rhythm heart rate 68 bpm     Echo:  Echo 1/17/17:   1. The left ventricular systolic function is normal with a 55% estimated  ejection fraction.   2. Spectral Doppler shows an impaired relaxation pattern of left ventricular  diastolic filling.   3. The left atrium is upper limits of normal in size.   4. There is mild tricuspid regurgitation.   5. A PFO is present (positive bubble study).    Ejection Fractions:  LVEF 55%  Cath:    Stress Test:  Nuclear Stress Test 10/10/2023  1. Negative myocardial perfusion study without evidence of inducible  myocardial ischemia or prior infarction.  2. The left ventricle is normal in size.  3. Normal LV wall motion with a post-stress LV EF estimated at 57%.    Cardiac Imaging:  CT cardiac scoring wo IV contrast 09/27/2023  LM 0,  .91,  LCx 0,  .3,     Total 522.21      Assessment/Plan   Very pleasant 49-year-old female with history of PVCs and migraine with aura, CT calcium score is elevated 522, nuclear stress test was negative for ischemia. Heart palpitations have improved, continue Metoprolol  mg daily and then 25 mg daily.  Smoking cessation strongly encouraged. Heart rate and blood pressure is well controlled today.     Plan   -call with any questions   -continue Aspirin 81 mg daily   -continue Metoprolol to 100 mg in the evening and 25 mg as needed -Smoking cessation strongly encouraged   -increase activity   -follow up in six months   -continue  Crestor 5 mg daily        Aparna Crump, APRN-CNP  "

## 2025-01-17 NOTE — PATIENT INSTRUCTIONS
CALL WITH ANY QUESTIONS   NO MEDICATION CHANGES   FOLLOW UP IN SIX MONTHS    hard copy, drawn during this pregnancy

## 2025-01-20 LAB
ATRIAL RATE: 68 BPM
P AXIS: 43 DEGREES
P OFFSET: 191 MS
P ONSET: 130 MS
PR INTERVAL: 180 MS
Q ONSET: 220 MS
QRS COUNT: 11 BEATS
QRS DURATION: 86 MS
QT INTERVAL: 394 MS
QTC CALCULATION(BAZETT): 418 MS
QTC FREDERICIA: 411 MS
R AXIS: 34 DEGREES
T AXIS: 14 DEGREES
T OFFSET: 417 MS
VENTRICULAR RATE: 68 BPM

## 2025-01-24 ENCOUNTER — APPOINTMENT (OUTPATIENT)
Dept: PRIMARY CARE | Facility: CLINIC | Age: 50
End: 2025-01-24
Payer: COMMERCIAL

## 2025-01-24 VITALS
OXYGEN SATURATION: 97 % | WEIGHT: 293 LBS | DIASTOLIC BLOOD PRESSURE: 81 MMHG | SYSTOLIC BLOOD PRESSURE: 119 MMHG | HEART RATE: 71 BPM | BODY MASS INDEX: 45.31 KG/M2

## 2025-01-24 DIAGNOSIS — F17.210 CIGARETTE NICOTINE DEPENDENCE WITHOUT COMPLICATION: ICD-10-CM

## 2025-01-24 DIAGNOSIS — Z12.11 SCREENING FOR COLON CANCER: ICD-10-CM

## 2025-01-24 DIAGNOSIS — E66.813 CLASS 3 SEVERE OBESITY DUE TO EXCESS CALORIES WITH SERIOUS COMORBIDITY AND BODY MASS INDEX (BMI) OF 45.0 TO 49.9 IN ADULT: ICD-10-CM

## 2025-01-24 DIAGNOSIS — I25.10 CORONARY ARTERY DISEASE INVOLVING NATIVE CORONARY ARTERY OF NATIVE HEART WITHOUT ANGINA PECTORIS: Primary | ICD-10-CM

## 2025-01-24 DIAGNOSIS — E66.01 CLASS 3 SEVERE OBESITY DUE TO EXCESS CALORIES WITH SERIOUS COMORBIDITY AND BODY MASS INDEX (BMI) OF 45.0 TO 49.9 IN ADULT: ICD-10-CM

## 2025-01-24 DIAGNOSIS — F17.200 TOBACCO DEPENDENCE: ICD-10-CM

## 2025-01-24 PROCEDURE — 3079F DIAST BP 80-89 MM HG: CPT | Performed by: INTERNAL MEDICINE

## 2025-01-24 PROCEDURE — 3074F SYST BP LT 130 MM HG: CPT | Performed by: INTERNAL MEDICINE

## 2025-01-24 PROCEDURE — 99214 OFFICE O/P EST MOD 30 MIN: CPT | Performed by: INTERNAL MEDICINE

## 2025-01-24 RX ORDER — SEMAGLUTIDE 0.25 MG/.5ML
0.25 INJECTION, SOLUTION SUBCUTANEOUS WEEKLY
Qty: 2 ML | Refills: 0 | Status: SHIPPED | OUTPATIENT
Start: 2025-01-24 | End: 2025-02-15

## 2025-01-24 ASSESSMENT — COLUMBIA-SUICIDE SEVERITY RATING SCALE - C-SSRS
2. HAVE YOU ACTUALLY HAD ANY THOUGHTS OF KILLING YOURSELF?: NO
1. IN THE PAST MONTH, HAVE YOU WISHED YOU WERE DEAD OR WISHED YOU COULD GO TO SLEEP AND NOT WAKE UP?: NO
6. HAVE YOU EVER DONE ANYTHING, STARTED TO DO ANYTHING, OR PREPARED TO DO ANYTHING TO END YOUR LIFE?: NO

## 2025-01-24 ASSESSMENT — PAIN SCALES - GENERAL: PAINLEVEL_OUTOF10: 0-NO PAIN

## 2025-01-24 NOTE — PROGRESS NOTES
Subjective   Patient ID: Sarah Gay is a 49 y.o. female who presents for Follow-up (Lab work).    HPI   Weight loss had been discussed at previous visit. Daughter suffered a miscarriage. Stepmom's glioblastoma is back. Patient has been stressed. Has not made lifestyle changes. Does not want any surgeries such as gastric bypass. Would like to discuss GLP-1 agonists.    No history of thyroid cancer in self or family.    Smoking about 1/2 of a pack per day at this time.    Still taking metoprolol. Still having some palpitations/PVCs. Follows with cardiology.    Denies CP or SOB.    12 point review of systems negative unless otherwise noted in HPI.    Review of Systems   All other systems reviewed and are negative.      Objective   /81   Pulse 71   Wt 135 kg (298 lb)   SpO2 97%   BMI 45.31 kg/m²     Physical Exam  Constitutional:       Appearance: Normal appearance. She is obese.   HENT:      Head: Normocephalic and atraumatic.   Cardiovascular:      Rate and Rhythm: Normal rate and regular rhythm.      Heart sounds: Normal heart sounds. No murmur heard.     No gallop.   Pulmonary:      Effort: Pulmonary effort is normal. No respiratory distress.      Breath sounds: No wheezing or rales.   Skin:     General: Skin is warm and dry.      Findings: No rash.   Neurological:      Mental Status: She is alert and oriented to person, place, and time. Mental status is at baseline.   Psychiatric:         Mood and Affect: Mood normal.         Behavior: Behavior normal.         Assessment/Plan   #Class III obesity   - Start Wegovy 0.25 mg weekly for 4 weeks, titrate up as tolerated   - recommended miralax and metamucil fiber for constipation     #PVCs, CAD (high cardiac score)  -Follow with cardiology. Cont metoprolol XL 125mg daily     #HLD  -Cont rosuvastatin 5mg daily. Check FBW    #Nicotine dependence   -Encouraged complete cessation. Does not want Chantix.  -Patient will consider nicotine patches and gum when she  is ready.  -Low dose lung CT ordered    Influenza: declines  COVID: x3  Prevnar 20: Recommended  Shingrix: Recommended  Mamm: 8/16/24  DEXA: Never  Pap: per gyn  Colorectal ca: Never - colonoscopy ordered  Lung ca screening: Ordered today       Duarte Fox DO  Internal Medicine PGY-2  I saw and evaluated the patient. I personally obtained the key and critical portions of the history and physical exam or was physically present for key and critical portions performed by the resident/fellow. I reviewed the resident/fellow's documentation and discussed the patient with the resident/fellow. I agree with the resident/fellow's medical decision making as documented in the note.    Ilya Levine, DO

## 2025-01-24 NOTE — PATIENT INSTRUCTIONS
Start Wegovy 0.25 mg once per week for four weeks.    We recommend miralax and metamucil if developing constipation with Wegovy.    We recommend the shingles vaccine and the Prevnar 20 vaccine at your local pharmacy. The shingles vaccine is a 2 shot series a couple of months apart.    Schedule your low dose CT scan. [phone]    Schedule your colonoscopy.  [facility]: [phone]   [facility]: [phone]    Return to clinic in 3 months.

## 2025-01-31 ENCOUNTER — TELEPHONE (OUTPATIENT)
Dept: CARDIOLOGY | Facility: HOSPITAL | Age: 50
End: 2025-01-31
Payer: COMMERCIAL

## 2025-04-18 ENCOUNTER — APPOINTMENT (OUTPATIENT)
Dept: PRIMARY CARE | Facility: CLINIC | Age: 50
End: 2025-04-18
Payer: COMMERCIAL

## 2025-05-07 ENCOUNTER — APPOINTMENT (OUTPATIENT)
Dept: PRIMARY CARE | Facility: CLINIC | Age: 50
End: 2025-05-07
Payer: COMMERCIAL

## 2025-05-19 ENCOUNTER — APPOINTMENT (OUTPATIENT)
Dept: PRIMARY CARE | Facility: CLINIC | Age: 50
End: 2025-05-19
Payer: COMMERCIAL

## 2025-06-11 ENCOUNTER — APPOINTMENT (OUTPATIENT)
Dept: PRIMARY CARE | Facility: CLINIC | Age: 50
End: 2025-06-11
Payer: COMMERCIAL

## 2025-06-27 ENCOUNTER — APPOINTMENT (OUTPATIENT)
Dept: PRIMARY CARE | Facility: CLINIC | Age: 50
End: 2025-06-27
Payer: COMMERCIAL

## 2025-06-27 VITALS
HEART RATE: 73 BPM | SYSTOLIC BLOOD PRESSURE: 132 MMHG | DIASTOLIC BLOOD PRESSURE: 87 MMHG | OXYGEN SATURATION: 96 % | BODY MASS INDEX: 44.41 KG/M2 | HEIGHT: 68 IN | WEIGHT: 293 LBS

## 2025-06-27 DIAGNOSIS — F17.210 CIGARETTE NICOTINE DEPENDENCE WITHOUT COMPLICATION: ICD-10-CM

## 2025-06-27 DIAGNOSIS — Z12.11 SCREENING FOR COLON CANCER: ICD-10-CM

## 2025-06-27 DIAGNOSIS — E66.813 CLASS 3 SEVERE OBESITY DUE TO EXCESS CALORIES WITH SERIOUS COMORBIDITY AND BODY MASS INDEX (BMI) OF 45.0 TO 49.9 IN ADULT: Primary | ICD-10-CM

## 2025-06-27 PROCEDURE — 99214 OFFICE O/P EST MOD 30 MIN: CPT | Performed by: INTERNAL MEDICINE

## 2025-06-27 PROCEDURE — 3079F DIAST BP 80-89 MM HG: CPT | Performed by: INTERNAL MEDICINE

## 2025-06-27 PROCEDURE — 3008F BODY MASS INDEX DOCD: CPT | Performed by: INTERNAL MEDICINE

## 2025-06-27 PROCEDURE — 3075F SYST BP GE 130 - 139MM HG: CPT | Performed by: INTERNAL MEDICINE

## 2025-06-27 ASSESSMENT — COLUMBIA-SUICIDE SEVERITY RATING SCALE - C-SSRS
6. HAVE YOU EVER DONE ANYTHING, STARTED TO DO ANYTHING, OR PREPARED TO DO ANYTHING TO END YOUR LIFE?: NO
1. IN THE PAST MONTH, HAVE YOU WISHED YOU WERE DEAD OR WISHED YOU COULD GO TO SLEEP AND NOT WAKE UP?: NO
2. HAVE YOU ACTUALLY HAD ANY THOUGHTS OF KILLING YOURSELF?: NO

## 2025-06-27 ASSESSMENT — ENCOUNTER SYMPTOMS
OCCASIONAL FEELINGS OF UNSTEADINESS: 0
DEPRESSION: 0
LOSS OF SENSATION IN FEET: 0

## 2025-06-27 ASSESSMENT — PATIENT HEALTH QUESTIONNAIRE - PHQ9
SUM OF ALL RESPONSES TO PHQ9 QUESTIONS 1 AND 2: 0
1. LITTLE INTEREST OR PLEASURE IN DOING THINGS: NOT AT ALL
2. FEELING DOWN, DEPRESSED OR HOPELESS: NOT AT ALL

## 2025-06-27 NOTE — PROGRESS NOTES
"Subjective   Patient ID: Saarh Gay is a 49 y.o. female who presents for Follow-up.    HPI     Patient very frustrated about weight gain. Wegovy was very expensive. When she lost weight in the past, she completely stopped eating. Patient feels she binges.   Denied c/p, N,V,sob, LUTS.    Smoking about 1/2 of a pack per day at this time.      Review of Systems   All other systems reviewed and are negative.      Objective   /87   Pulse 73   Ht 1.727 m (5' 8\")   Wt 145 kg (319 lb)   SpO2 96%   BMI 48.50 kg/m²     Physical Exam  Constitutional:       Appearance: Normal appearance. She is obese.   HENT:      Head: Normocephalic and atraumatic.   Cardiovascular:      Rate and Rhythm: Normal rate and regular rhythm.      Heart sounds: Normal heart sounds. No murmur heard.     No gallop.   Pulmonary:      Effort: Pulmonary effort is normal. No respiratory distress.      Breath sounds: No wheezing or rales.   Skin:     General: Skin is warm and dry.      Findings: No rash.   Neurological:      Mental Status: She is alert and oriented to person, place, and time. Mental status is at baseline.   Psychiatric:         Mood and Affect: Mood normal.         Behavior: Behavior normal.       Assessment/Plan   #Class III obesity   - will start 2.5 mg tirzepatide weekly, will titrate up as tolerated--[facility]     #PVCs, CAD (high cardiac score)  -Follow with cardiology. Cont metoprolol XL 125mg daily     #HLD  -Cont rosuvastatin 5mg daily.     #Nicotine dependence   -Encouraged complete cessation. Does not want Chantix.  -Low dose lung CT ordered last visit--encouraged to complete     Influenza: declines  COVID: x3  Prevnar 20: Recommended  Shingrix: Recommended  Mamm: 8/16/24--order next visit   DEXA: Never  Pap: per gyn  Colorectal ca: colonoscopy ordered last visit- ordered cologuard today  Lung ca screening: Ordered last visit     RTC 3 months    Stephanie Glass M.D.   Internal Medicine, PGY 1   "

## 2025-06-27 NOTE — PATIENT INSTRUCTIONS
Thank you for your visit, for next time please:    - send a my chart message in 2 weeks to tell us how you are doing on tizapetide  - we sent a prescription for tirzepatide 2.5 mg weekly  - ordered cologuard, will be shipped to you.   - Do the CT lung  - start adding fiber     Will see you back in 3 months.

## 2025-07-14 DIAGNOSIS — E66.813 CLASS 3 SEVERE OBESITY DUE TO EXCESS CALORIES WITH SERIOUS COMORBIDITY AND BODY MASS INDEX (BMI) OF 45.0 TO 49.9 IN ADULT: ICD-10-CM

## 2025-07-16 LAB — NONINV COLON CA DNA+OCC BLD SCRN STL QL: NEGATIVE

## 2025-07-17 ENCOUNTER — APPOINTMENT (OUTPATIENT)
Dept: UROLOGY | Facility: CLINIC | Age: 50
End: 2025-07-17
Payer: COMMERCIAL

## 2025-07-18 ENCOUNTER — APPOINTMENT (OUTPATIENT)
Dept: CARDIOLOGY | Facility: HOSPITAL | Age: 50
End: 2025-07-18
Payer: COMMERCIAL

## 2025-08-01 ENCOUNTER — APPOINTMENT (OUTPATIENT)
Dept: CARDIOLOGY | Facility: HOSPITAL | Age: 50
End: 2025-08-01
Payer: COMMERCIAL

## 2025-08-01 VITALS
BODY MASS INDEX: 45.12 KG/M2 | HEART RATE: 80 BPM | WEIGHT: 293 LBS | DIASTOLIC BLOOD PRESSURE: 82 MMHG | OXYGEN SATURATION: 98 % | SYSTOLIC BLOOD PRESSURE: 119 MMHG

## 2025-08-01 DIAGNOSIS — R00.2 PALPITATIONS: ICD-10-CM

## 2025-08-01 PROCEDURE — 3074F SYST BP LT 130 MM HG: CPT | Performed by: NURSE PRACTITIONER

## 2025-08-01 PROCEDURE — 3079F DIAST BP 80-89 MM HG: CPT | Performed by: NURSE PRACTITIONER

## 2025-08-01 PROCEDURE — 99214 OFFICE O/P EST MOD 30 MIN: CPT | Performed by: NURSE PRACTITIONER

## 2025-08-01 PROCEDURE — 99212 OFFICE O/P EST SF 10 MIN: CPT

## 2025-08-01 RX ORDER — METOPROLOL SUCCINATE 100 MG/1
100 TABLET, EXTENDED RELEASE ORAL DAILY
Qty: 90 TABLET | Refills: 3 | Status: SHIPPED | OUTPATIENT
Start: 2025-08-01 | End: 2026-08-01

## 2025-08-01 ASSESSMENT — ENCOUNTER SYMPTOMS
GASTROINTESTINAL NEGATIVE: 1
LOSS OF SENSATION IN FEET: 0
HEMATOLOGIC/LYMPHATIC NEGATIVE: 1
PSYCHIATRIC NEGATIVE: 1
DEPRESSION: 0
MYALGIAS: 1
NEUROLOGICAL NEGATIVE: 1
ENDOCRINE NEGATIVE: 1
PALPITATIONS: 1
CONSTITUTIONAL NEGATIVE: 1
OCCASIONAL FEELINGS OF UNSTEADINESS: 0
RESPIRATORY NEGATIVE: 1
EYES NEGATIVE: 1

## 2025-08-01 NOTE — PROGRESS NOTES
Referred by Dr. Corona ref. provider found for Follow-up     History Of Present Illness:    Sarah Gay is a very pleasant 49 year old female with a history of PVCs and migraines, she is here for a follow up visit. The patient is seen in collaboration with Dr. Cormier. Mrs. Gay started on Zepound, has been losing weight. States she is feeling better, has noticed her pain has improved. Has been watching diet. Has felt her palpitations have improved. Had an episode of near syncope. Had an anxiety attack.     Review of Systems   Constitutional: Negative.   HENT: Negative.     Eyes: Negative.    Cardiovascular:  Positive for palpitations.   Respiratory: Negative.     Endocrine: Negative.    Hematologic/Lymphatic: Negative.    Skin: Negative.    Musculoskeletal:  Positive for myalgias.   Gastrointestinal: Negative.    Neurological: Negative.    Psychiatric/Behavioral: Negative.            Past Medical History:  She has a past medical history of Allergic (Childhood), Allergic rhinitis (As a child), Arthritis (As adult, total knee 2018), Congenital heart disease, Coronary artery disease, Dizziness (20+ years ago), Headache, Heart disease, HL (hearing loss) (Adult), Hypertension, Migraine (Teenager), Obesity, Open bite of unspecified hand, initial encounter (05/04/2021), Other conditions influencing health status (08/09/2021), Other specified health status, Otitis media, chronic, Rotator cuff syndrome, Tinnitus, Urinary incontinence, and Varicella (1986).    Past Surgical History:  She has a past surgical history that includes Tonsillectomy (02/01/2017); Other surgical history (10/05/2022); MR angio head wo IV contrast (1/16/2017); MR angio neck wo IV contrast (1/16/2017); MR angio head wo IV contrast (8/25/2012); Joint replacement (September 2018); and [place] tooth extraction (1995).      Social History:  She reports that she has been smoking cigarettes. She has a 15 pack-year smoking history. She has never used  smokeless tobacco. She reports that she does not drink alcohol and does not use drugs.    Family History:  Family History   Problem Relation Name Age of Onset    Abnormal EKG Mother Katharine     Arthritis Mother Katharine     Miscarriages / Stillbirths Mother Katharine      labor Mother Katharine     Hypertension Mother Katharine     Abnormal EKG Sister Kenyatta     Arthritis Sister Kenyatta     Miscarriages / Stillbirths Sister Kenyatta     Cancer Sister Siri     Ovarian cancer Sister Siri 30    Arthritis Sister Siri     Breast cancer Paternal Grandmother  60 - 69    Arthritis Father Kenyon     Hypertension Father Kenyon     Arthritis Father Kenyon     Hypertension Father Kenyon     Breast cancer Paternal Grandmother Mary         Allergies:  Adhesive tape-silicones, Bee venom protein (honey bee), Shellfish derived, and Grass pollen    Outpatient Medications:  Current Outpatient Medications   Medication Instructions    aspirin 81 mg, Daily    metoprolol succinate XL (TOPROL-XL) 25 mg, oral, Daily, Do not crush or chew.    metoprolol succinate XL (TOPROL-XL) 100 mg, oral, Daily, DO NOT CRUSH OR CHEW    rosuvastatin (CRESTOR) 5 mg, oral, Daily    tirzepatide (weight loss) 2.5 mg, subcutaneous, Every 7 days        Last Recorded Vitals:  Vitals:    25 0947   BP: 119/82   Pulse: 80   SpO2: 98%   Weight: 135 kg (296 lb 11.8 oz)           Physical Exam:  Physical Exam  Vitals reviewed.   HENT:      Head: Normocephalic.      Nose: Nose normal.   Eyes:      Pupils: Pupils are equal, round, and reactive to light.   Cardiovascular:      Rate and Rhythm: Normal rate and regular rhythm.   Pulmonary:      Effort: Pulmonary effort is normal.      Breath sounds: Normal breath sounds.   Abdominal:      General: Abdomen is flat.      Palpations: Abdomen is soft.   Musculoskeletal:         General: Normal range of motion.      Cervical back: Normal range of motion.   Skin:     General: Skin is warm and dry.   Neurological:      General: No  "focal deficit present.      Mental Status: He is alert and oriented to person, place, and time.   Psychiatric:         Mood and Affect: Mood normal.            Last Labs:  CBC -  Lab Results   Component Value Date    WBC 6.1 01/17/2025    HGB 15.2 01/17/2025    HCT 46.3 (H) 01/17/2025    MCV 91 01/17/2025     01/17/2025       CMP -  Lab Results   Component Value Date    CALCIUM 9.1 01/17/2025    PROT 6.5 01/17/2025    ALBUMIN 4.3 01/17/2025    AST 14 01/17/2025    ALT 9 01/17/2025    ALKPHOS 46 01/17/2025    BILITOT 0.6 01/17/2025       LIPID PANEL -   Lab Results   Component Value Date    CHOL 143 01/17/2025    TRIG 57 01/17/2025    HDL 40.4 01/17/2025    CHHDL 3.5 01/17/2025    LDLF 147 (H) 10/28/2022    VLDL 11 01/17/2025    NHDL 103 01/17/2025       RENAL FUNCTION PANEL -   Lab Results   Component Value Date    GLUCOSE 88 01/17/2025     01/17/2025    K 4.5 01/17/2025     01/17/2025    CO2 28 01/17/2025    ANIONGAP 11 01/17/2025    BUN 11 01/17/2025    CREATININE 0.76 01/17/2025    CALCIUM 9.1 01/17/2025    ALBUMIN 4.3 01/17/2025        No results found for: \"BNP\", \"HGBA1C\"    Last Cardiology Tests:  ECG:    Echo:  Echo 1/17/17:   1. The left ventricular systolic function is normal with a 55% estimated  ejection fraction.   2. Spectral Doppler shows an impaired relaxation pattern of left ventricular  diastolic filling.   3. The left atrium is upper limits of normal in size.   4. There is mild tricuspid regurgitation.   5. A PFO is present (positive bubble study).    Ejection Fractions:  LVEF 55%  Cath:    Stress Test:  Nuclear Stress Test 10/10/2023  1. Negative myocardial perfusion study without evidence of inducible  myocardial ischemia or prior infarction.  2. The left ventricle is normal in size.  3. Normal LV wall motion with a post-stress LV EF estimated at 57%.    Cardiac Imaging:  CT cardiac scoring wo IV contrast 09/27/2023  LM 0,  .91,  LCx 0,  .3,     Total " 522.21      Assessment/Plan   Very pleasant 49-year-old female with history of PVCs and migraine with aura, CT calcium score is elevated 522, nuclear stress test was negative for ischemia. Heart palpitations have improved, continue Metoprolol  mg daily and then 25 mg daily.  Weight loss was congratulated. Smoking cessation strongly encouraged. Heart rate and blood pressure is well controlled today.     Plan   -call with any questions   -continue Aspirin 81 mg daily   -continue Metoprolol to 100 mg in the evening and 25 mg as needed -Smoking cessation strongly encouraged   -increase activity   -follow up in six months   -continue  Crestor 5 mg daily        Aparna Crump, APRN-CNP

## 2025-08-08 ENCOUNTER — HOSPITAL ENCOUNTER (OUTPATIENT)
Dept: RADIOLOGY | Facility: HOSPITAL | Age: 50
Discharge: HOME | End: 2025-08-08
Payer: COMMERCIAL

## 2025-08-08 ENCOUNTER — OFFICE VISIT (OUTPATIENT)
Dept: PRIMARY CARE | Facility: CLINIC | Age: 50
End: 2025-08-08
Payer: COMMERCIAL

## 2025-08-08 VITALS
WEIGHT: 293 LBS | SYSTOLIC BLOOD PRESSURE: 124 MMHG | BODY MASS INDEX: 44.41 KG/M2 | DIASTOLIC BLOOD PRESSURE: 82 MMHG | OXYGEN SATURATION: 97 % | HEART RATE: 87 BPM | HEIGHT: 68 IN

## 2025-08-08 DIAGNOSIS — N61.1 BREAST ABSCESS: Primary | ICD-10-CM

## 2025-08-08 DIAGNOSIS — F17.210 CIGARETTE NICOTINE DEPENDENCE WITHOUT COMPLICATION: ICD-10-CM

## 2025-08-08 DIAGNOSIS — Z01.419 PAP TEST, AS PART OF ROUTINE GYNECOLOGICAL EXAMINATION: ICD-10-CM

## 2025-08-08 PROCEDURE — 99213 OFFICE O/P EST LOW 20 MIN: CPT | Performed by: INTERNAL MEDICINE

## 2025-08-08 PROCEDURE — 71271 CT THORAX LUNG CANCER SCR C-: CPT

## 2025-08-08 PROCEDURE — 3008F BODY MASS INDEX DOCD: CPT | Performed by: INTERNAL MEDICINE

## 2025-08-08 PROCEDURE — 3079F DIAST BP 80-89 MM HG: CPT | Performed by: INTERNAL MEDICINE

## 2025-08-08 PROCEDURE — 3074F SYST BP LT 130 MM HG: CPT | Performed by: INTERNAL MEDICINE

## 2025-08-08 RX ORDER — SULFAMETHOXAZOLE AND TRIMETHOPRIM 800; 160 MG/1; MG/1
1 TABLET ORAL 2 TIMES DAILY
Qty: 14 TABLET | Refills: 0 | Status: SHIPPED | OUTPATIENT
Start: 2025-08-08 | End: 2025-08-15

## 2025-08-08 RX ORDER — FLUCONAZOLE 150 MG/1
150 TABLET ORAL ONCE
Qty: 1 TABLET | Refills: 0 | Status: SHIPPED | OUTPATIENT
Start: 2025-08-08 | End: 2025-08-08

## 2025-08-08 ASSESSMENT — ENCOUNTER SYMPTOMS
LOSS OF SENSATION IN FEET: 0
OCCASIONAL FEELINGS OF UNSTEADINESS: 0
DEPRESSION: 0

## 2025-08-08 NOTE — PROGRESS NOTES
"Subjective   Patient ID: Sarah Gay is a 49 y.o. female who presents for Follow-up.    LUCHO Smith came into the office today for a \"boil that looks infected\" under breast. She stated she had 2 similar bumps in a similar area that resolved on their own. She also had a similar pimple type bump in her axilla. This was the 3rd one that came up under her left breast and said it was more red than the others and when it broke open had purulent discharge. She is concerned for infection and worried about a breast cancer she had heard of that presents as a rash.     She also brought up that she would like to get a referral for a gynecologist to follow with for preventative reproductive care.     In discussion of her Tirzepatide she states that she is very excited about the current results. She has had reduced food cravings, and weight loss. She does describe nausea and feeling poor for the 2 days following injections, but does not want to change her regimen. She follows with a food therapist and has really enjoyed it.       Review of Systems    Objective   /82   Pulse 87   Ht 1.727 m (5' 8\")   Wt 133 kg (293 lb)   SpO2 97%   BMI 44.55 kg/m²     Physical Exam    Assessment/Plan     #Breast abscess   :fully drained   - Bactrim 800mg/160mg BID for 7 days   - Diflucan 150mg   - Advised to return if symptoms worsen or do not improve with treatment.     #Preventative Health   - Gynecology referral        "

## 2025-08-08 NOTE — PATIENT INSTRUCTIONS
Please get mammogram done  Please start bactrim 800-160 mg twice a day for 7 days  Please take fluconazole 150mg once    Return to clinic if breast infection worsens or does not improve with treatment.

## 2025-08-09 NOTE — PROGRESS NOTES
"Subjective   Patient ID: Sarah Gay is a 49 y.o. female who presents for Follow-up.    LUCHO Smith came into the office today for a \"boil that looks infected\" under breast. She stated she had 2 similar bumps in a similar area that resolved on their own. She also had a similar pimple type bump in her axilla. This was the 3rd one that came up under her left breast and said it was more red than the others and when it broke open had purulent discharge. She is concerned for infection and worried about a breast cancer she had heard of that presents as a rash.      She also brought up that she would like to get a referral for a gynecologist to follow with for preventative reproductive care.      In discussion of her Tirzepatide she states that she is very excited about the current results. She has had reduced food cravings, and weight loss. She does describe nausea and feeling poor for the 2 days following injections, but does not want to change her regimen. She follows with a food therapist and has really enjoyed it.        Review of Systems    Objective   /82   Pulse 87   Ht 1.727 m (5' 8\")   Wt 133 kg (293 lb)   SpO2 97%   BMI 44.55 kg/m²     Physical Exam    Musculoskeletal:      Comments: 1cm area of erythema with opening , no drainage or fluctuance, no breast mass appreciated          Assessment/Plan       #Breast abscess   - spontaneously drainaged,no fluctuance. Will defer surgery referral for now   - Rx Bactrim 800mg/160mg BID for 7 days   - Diflucan 150mg x 1 dose   - Advised to return if symptoms worsen or do not improve with treatment.  - Encouraged patient to complete screening mammogram       #Preventative Health   - Gynecology referral        "

## 2025-08-11 ENCOUNTER — APPOINTMENT (OUTPATIENT)
Dept: PRIMARY CARE | Facility: CLINIC | Age: 50
End: 2025-08-11
Payer: COMMERCIAL

## 2025-08-11 DIAGNOSIS — E66.813 CLASS 3 SEVERE OBESITY DUE TO EXCESS CALORIES WITH SERIOUS COMORBIDITY AND BODY MASS INDEX (BMI) OF 45.0 TO 49.9 IN ADULT: ICD-10-CM

## 2025-08-11 DIAGNOSIS — E78.5 HYPERLIPIDEMIA, UNSPECIFIED HYPERLIPIDEMIA TYPE: Primary | ICD-10-CM

## 2025-08-29 ENCOUNTER — APPOINTMENT (OUTPATIENT)
Dept: RADIOLOGY | Facility: HOSPITAL | Age: 50
End: 2025-08-29
Payer: COMMERCIAL

## 2025-08-29 DIAGNOSIS — Z12.31 SCREENING MAMMOGRAM FOR BREAST CANCER: ICD-10-CM

## 2025-09-12 ENCOUNTER — APPOINTMENT (OUTPATIENT)
Dept: RADIOLOGY | Facility: HOSPITAL | Age: 50
End: 2025-09-12
Payer: COMMERCIAL

## 2025-09-15 ENCOUNTER — APPOINTMENT (OUTPATIENT)
Dept: PRIMARY CARE | Facility: CLINIC | Age: 50
End: 2025-09-15
Payer: COMMERCIAL

## 2025-10-09 ENCOUNTER — APPOINTMENT (OUTPATIENT)
Dept: UROLOGY | Facility: CLINIC | Age: 50
End: 2025-10-09
Payer: COMMERCIAL